# Patient Record
Sex: FEMALE | Race: BLACK OR AFRICAN AMERICAN | Employment: FULL TIME | URBAN - METROPOLITAN AREA
[De-identification: names, ages, dates, MRNs, and addresses within clinical notes are randomized per-mention and may not be internally consistent; named-entity substitution may affect disease eponyms.]

---

## 2018-08-24 ENCOUNTER — HOSPITAL ENCOUNTER (OUTPATIENT)
Dept: NUCLEAR MEDICINE | Age: 38
Discharge: HOME | End: 2018-08-24
Admitting: INTERNAL MEDICINE

## 2018-08-24 DIAGNOSIS — E83.52 HYPERCALCEMIA: ICD-10-CM

## 2018-08-24 RX ADMIN — Medication 20 MILLICURIE: at 10:07

## 2018-09-07 ENCOUNTER — OFFICE VISIT (OUTPATIENT)
Dept: SURGERY | Age: 38
End: 2018-09-07

## 2018-09-07 VITALS
OXYGEN SATURATION: 99 % | TEMPERATURE: 98.4 F | BODY MASS INDEX: 42.32 KG/M2 | HEART RATE: 84 BPM | WEIGHT: 254 LBS | HEIGHT: 65 IN | SYSTOLIC BLOOD PRESSURE: 146 MMHG | DIASTOLIC BLOOD PRESSURE: 101 MMHG

## 2018-09-07 DIAGNOSIS — D35.1 PARATHYROID ADENOMA: Primary | ICD-10-CM

## 2018-09-07 PROCEDURE — 99204 OFFICE O/P NEW MOD 45 MIN: CPT | Performed by: SURGERY

## 2018-10-22 ENCOUNTER — TELEPHONE (OUTPATIENT)
Dept: ENDOCRINOLOGY | Age: 38
End: 2018-10-22

## 2018-10-24 ENCOUNTER — OFFICE VISIT (OUTPATIENT)
Dept: ENDOCRINOLOGY | Age: 38
End: 2018-10-24
Payer: COMMERCIAL

## 2018-10-24 VITALS
DIASTOLIC BLOOD PRESSURE: 92 MMHG | WEIGHT: 254.8 LBS | HEART RATE: 89 BPM | SYSTOLIC BLOOD PRESSURE: 159 MMHG | HEIGHT: 65 IN | BODY MASS INDEX: 42.45 KG/M2

## 2018-10-24 DIAGNOSIS — E83.52 HYPERCALCEMIA: ICD-10-CM

## 2018-10-24 DIAGNOSIS — E21.3 HYPERPARATHYROIDISM (HCC): Primary | ICD-10-CM

## 2018-10-24 DIAGNOSIS — I10 ESSENTIAL HYPERTENSION: ICD-10-CM

## 2018-10-24 PROCEDURE — 99204 OFFICE O/P NEW MOD 45 MIN: CPT | Performed by: INTERNAL MEDICINE

## 2018-10-26 DIAGNOSIS — E21.3 HYPERPARATHYROIDISM (HCC): ICD-10-CM

## 2018-10-26 DIAGNOSIS — E83.52 HYPERCALCEMIA: ICD-10-CM

## 2018-10-26 LAB
24HR URINE VOLUME (ML): 2200 ML
A/G RATIO: 1.8 (ref 1.1–2.2)
ALBUMIN SERPL-MCNC: 4.6 G/DL (ref 3.4–5)
ALP BLD-CCNC: 146 U/L (ref 40–129)
ALT SERPL-CCNC: 20 U/L (ref 10–40)
ANION GAP SERPL CALCULATED.3IONS-SCNC: 9 MMOL/L (ref 3–16)
AST SERPL-CCNC: 17 U/L (ref 15–37)
BILIRUB SERPL-MCNC: 0.4 MG/DL (ref 0–1)
BUN BLDV-MCNC: 7 MG/DL (ref 7–20)
CALCIUM 24 HOUR URINE: 636 MG/24 HR (ref 42–353)
CALCIUM SERPL-MCNC: 13.5 MG/DL (ref 8.3–10.6)
CHLORIDE BLD-SCNC: 107 MMOL/L (ref 99–110)
CO2: 25 MMOL/L (ref 21–32)
CREAT SERPL-MCNC: <0.5 MG/DL (ref 0.6–1.1)
CREATININE 24 HOUR URINE: 2.4 G/24HR (ref 0.6–1.5)
GFR AFRICAN AMERICAN: >60
GFR NON-AFRICAN AMERICAN: >60
GLOBULIN: 2.6 G/DL
GLUCOSE BLD-MCNC: 86 MG/DL (ref 70–99)
PARATHYROID HORMONE INTACT: 257.8 PG/ML (ref 14–72)
POTASSIUM SERPL-SCNC: 4.5 MMOL/L (ref 3.5–5.1)
REASON FOR REJECTION: NORMAL
REJECTED TEST: NORMAL
SODIUM BLD-SCNC: 141 MMOL/L (ref 136–145)
TOTAL PROTEIN: 7.2 G/DL (ref 6.4–8.2)
VITAMIN D 25-HYDROXY: 10.4 NG/ML

## 2018-10-29 ENCOUNTER — TELEPHONE (OUTPATIENT)
Dept: ENDOCRINOLOGY | Age: 38
End: 2018-10-29

## 2018-10-29 LAB — VITAMIN D 1,25-DIHYDROXY: 101 PG/ML (ref 19.9–79.3)

## 2018-10-29 RX ORDER — CINACALCET 30 MG/1
TABLET, FILM COATED ORAL
Qty: 30 TABLET | Refills: 3 | Status: SHIPPED | OUTPATIENT
Start: 2018-10-29 | End: 2018-10-31 | Stop reason: SDUPTHER

## 2018-10-29 NOTE — PROGRESS NOTES
Calcium 13.5   Vit D 10 Vit D 1, 25 101 24 hour urine Ca 635  Discussed with patient.    PTH high, Vitamin D 25 is low but may not explain this degree of PTH elevation  With positive parathyroid scan, suspect hyperparathyroidism, she has appt with Dr. Devon Caban on 11/14  Will also start on sensipar  Get CXR to r/o any granulomatous lesion as Vit D 1,25 high  Repeat BMP in 4 week with f/u  Plan discussed with patient
D was also low at that time, Calcium normalized. May have 4 gland hyperplasia as last scan shows area suspicious for parathyroid hyperactivity. Will also rule out other causes, repeat PTH. Check 24 hour urine Ca. If PTH high, start on sensipar until surgery  Prolactin nl, Unlikely MEN  2. HTN : On Norvasc, mildly high. Plan: 1. Check PTH, Vitamin D 25 , 1/25, PTHrP  2.24 hour urine Calcium  3. Discuss with Dr. Sherrel Severin about repeat surgery  4. May start on Sensipar

## 2018-10-31 ENCOUNTER — TELEPHONE (OUTPATIENT)
Dept: ENDOCRINOLOGY | Age: 38
End: 2018-10-31

## 2018-10-31 RX ORDER — CINACALCET 30 MG/1
TABLET, FILM COATED ORAL
Qty: 60 TABLET | Refills: 3 | Status: SHIPPED | OUTPATIENT
Start: 2018-10-31 | End: 2019-01-14 | Stop reason: SDUPTHER

## 2018-11-01 ENCOUNTER — TELEPHONE (OUTPATIENT)
Dept: ENDOCRINOLOGY | Age: 38
End: 2018-11-01

## 2018-11-01 ENCOUNTER — HOSPITAL ENCOUNTER (OUTPATIENT)
Age: 38
Discharge: HOME | End: 2018-11-01
Payer: COMMERCIAL

## 2018-11-01 ENCOUNTER — HOSPITAL ENCOUNTER (OUTPATIENT)
Dept: GENERAL RADIOLOGY | Age: 38
Discharge: HOME | End: 2018-11-01
Payer: COMMERCIAL

## 2018-11-01 DIAGNOSIS — E83.52 HYPERCALCEMIA: ICD-10-CM

## 2018-11-01 PROCEDURE — 71046 X-RAY EXAM CHEST 2 VIEWS: CPT

## 2018-11-05 ENCOUNTER — TELEPHONE (OUTPATIENT)
Dept: ENDOCRINOLOGY | Age: 38
End: 2018-11-05

## 2018-11-08 ENCOUNTER — TELEPHONE (OUTPATIENT)
Dept: ENDOCRINOLOGY | Age: 38
End: 2018-11-08

## 2018-11-12 ENCOUNTER — TELEPHONE (OUTPATIENT)
Dept: ENDOCRINOLOGY | Age: 38
End: 2018-11-12

## 2018-11-14 ENCOUNTER — TELEPHONE (OUTPATIENT)
Dept: ENDOCRINOLOGY | Age: 38
End: 2018-11-14

## 2018-11-16 ENCOUNTER — TELEPHONE (OUTPATIENT)
Dept: ENDOCRINOLOGY | Age: 38
End: 2018-11-16

## 2018-11-16 NOTE — TELEPHONE ENCOUNTER
I have spoke to pharmacy several times about this and told them I will let them know when I get an answer, they call every day will not call them back until I hear from the insurance

## 2018-11-20 ENCOUNTER — TELEPHONE (OUTPATIENT)
Dept: ENDOCRINOLOGY | Age: 38
End: 2018-11-20

## 2018-12-06 ENCOUNTER — TELEPHONE (OUTPATIENT)
Dept: ENDOCRINOLOGY | Age: 38
End: 2018-12-06

## 2018-12-06 NOTE — TELEPHONE ENCOUNTER
Pharmacy was checking on Sensipar PA. This was faxed in November to insurance but the pharmacy has not heard anything back. They asked to talk to the nurse.

## 2019-01-14 ENCOUNTER — TELEPHONE (OUTPATIENT)
Dept: ENDOCRINOLOGY | Age: 39
End: 2019-01-14

## 2019-01-14 RX ORDER — CINACALCET 30 MG/1
TABLET, FILM COATED ORAL
Qty: 60 TABLET | Refills: 3 | Status: SHIPPED | OUTPATIENT
Start: 2019-01-14 | End: 2019-02-05 | Stop reason: SDUPTHER

## 2019-01-21 ENCOUNTER — TELEPHONE (OUTPATIENT)
Dept: ENDOCRINOLOGY | Age: 39
End: 2019-01-21

## 2019-02-05 ENCOUNTER — OFFICE VISIT (OUTPATIENT)
Dept: ENDOCRINOLOGY | Age: 39
End: 2019-02-05
Payer: COMMERCIAL

## 2019-02-05 VITALS
HEIGHT: 65 IN | SYSTOLIC BLOOD PRESSURE: 147 MMHG | RESPIRATION RATE: 16 BRPM | BODY MASS INDEX: 42.28 KG/M2 | DIASTOLIC BLOOD PRESSURE: 98 MMHG | HEART RATE: 67 BPM | OXYGEN SATURATION: 99 % | WEIGHT: 253.8 LBS

## 2019-02-05 DIAGNOSIS — E83.52 HYPERCALCEMIA: ICD-10-CM

## 2019-02-05 DIAGNOSIS — I10 ESSENTIAL HYPERTENSION: Primary | ICD-10-CM

## 2019-02-05 DIAGNOSIS — E21.3 HYPERPARATHYROIDISM (HCC): ICD-10-CM

## 2019-02-05 PROCEDURE — 99214 OFFICE O/P EST MOD 30 MIN: CPT | Performed by: INTERNAL MEDICINE

## 2019-02-05 RX ORDER — CINACALCET 30 MG/1
TABLET, FILM COATED ORAL
Qty: 60 TABLET | Refills: 3 | Status: SHIPPED | OUTPATIENT
Start: 2019-02-05

## 2019-02-05 RX ORDER — CALCITONIN SALMON 200 [IU]/.09ML
1 SPRAY, METERED NASAL DAILY
Qty: 1 BOTTLE | Refills: 3 | Status: SHIPPED | OUTPATIENT
Start: 2019-02-05

## 2019-02-26 DIAGNOSIS — E83.52 HYPERCALCEMIA: ICD-10-CM

## 2019-02-26 DIAGNOSIS — E21.3 HYPERPARATHYROIDISM (HCC): ICD-10-CM

## 2019-02-26 LAB
ANION GAP SERPL CALCULATED.3IONS-SCNC: 14 MMOL/L (ref 3–16)
BUN BLDV-MCNC: 7 MG/DL (ref 7–20)
CALCIUM SERPL-MCNC: 11.8 MG/DL (ref 8.3–10.6)
CHLORIDE BLD-SCNC: 106 MMOL/L (ref 99–110)
CO2: 22 MMOL/L (ref 21–32)
CREAT SERPL-MCNC: <0.5 MG/DL (ref 0.6–1.1)
GFR AFRICAN AMERICAN: >60
GFR NON-AFRICAN AMERICAN: >60
GLUCOSE BLD-MCNC: 85 MG/DL (ref 70–99)
POTASSIUM SERPL-SCNC: 4 MMOL/L (ref 3.5–5.1)
SODIUM BLD-SCNC: 142 MMOL/L (ref 136–145)

## 2022-05-02 ENCOUNTER — HOSPITAL ENCOUNTER (EMERGENCY)
Age: 42
Discharge: HOME | End: 2022-05-02
Attending: EMERGENCY MEDICINE
Payer: COMMERCIAL

## 2022-05-02 ENCOUNTER — APPOINTMENT (OUTPATIENT)
Dept: GENERAL RADIOLOGY | Age: 42
End: 2022-05-02
Payer: COMMERCIAL

## 2022-05-02 VITALS
OXYGEN SATURATION: 99 % | HEIGHT: 65 IN | RESPIRATION RATE: 20 BRPM | DIASTOLIC BLOOD PRESSURE: 103 MMHG | TEMPERATURE: 98.6 F | SYSTOLIC BLOOD PRESSURE: 161 MMHG | BODY MASS INDEX: 42.65 KG/M2 | WEIGHT: 256 LBS | HEART RATE: 83 BPM

## 2022-05-02 DIAGNOSIS — J45.901 MODERATE ASTHMA WITH EXACERBATION, UNSPECIFIED WHETHER PERSISTENT: Primary | ICD-10-CM

## 2022-05-02 DIAGNOSIS — R03.0 ELEVATED BLOOD PRESSURE READING: ICD-10-CM

## 2022-05-02 LAB
ANION GAP SERPL CALCULATED.3IONS-SCNC: 8 MMOL/L (ref 3–16)
BASOPHILS ABSOLUTE: 0 K/UL (ref 0–0.2)
BASOPHILS RELATIVE PERCENT: 0.9 %
BUN BLDV-MCNC: 6 MG/DL (ref 7–20)
CALCIUM SERPL-MCNC: 13.3 MG/DL (ref 8.3–10.6)
CHLORIDE BLD-SCNC: 105 MMOL/L (ref 99–110)
CO2: 25 MMOL/L (ref 21–32)
CREAT SERPL-MCNC: 0.6 MG/DL (ref 0.6–1.1)
EOSINOPHILS ABSOLUTE: 0.1 K/UL (ref 0–0.6)
EOSINOPHILS RELATIVE PERCENT: 2.1 %
GFR AFRICAN AMERICAN: >60
GFR NON-AFRICAN AMERICAN: >60
GLUCOSE BLD-MCNC: 102 MG/DL (ref 70–99)
HCT VFR BLD CALC: 36.5 % (ref 36–48)
HEMOGLOBIN: 11.9 G/DL (ref 12–16)
LYMPHOCYTES ABSOLUTE: 2.3 K/UL (ref 1–5.1)
LYMPHOCYTES RELATIVE PERCENT: 43 %
MCH RBC QN AUTO: 29.4 PG (ref 26–34)
MCHC RBC AUTO-ENTMCNC: 32.6 G/DL (ref 31–36)
MCV RBC AUTO: 90.1 FL (ref 80–100)
MONOCYTES ABSOLUTE: 0.5 K/UL (ref 0–1.3)
MONOCYTES RELATIVE PERCENT: 9.8 %
NEUTROPHILS ABSOLUTE: 2.3 K/UL (ref 1.7–7.7)
NEUTROPHILS RELATIVE PERCENT: 44.2 %
PDW BLD-RTO: 14.8 % (ref 12.4–15.4)
PLATELET # BLD: 236 K/UL (ref 135–450)
PMV BLD AUTO: 9.6 FL (ref 5–10.5)
POTASSIUM SERPL-SCNC: 3.7 MMOL/L (ref 3.5–5.1)
RBC # BLD: 4.05 M/UL (ref 4–5.2)
SODIUM BLD-SCNC: 138 MMOL/L (ref 136–145)
TROPONIN: <0.01 NG/ML
WBC # BLD: 5.3 K/UL (ref 4–11)

## 2022-05-02 PROCEDURE — 80048 BASIC METABOLIC PNL TOTAL CA: CPT

## 2022-05-02 PROCEDURE — 99285 EMERGENCY DEPT VISIT HI MDM: CPT

## 2022-05-02 PROCEDURE — 85025 COMPLETE CBC W/AUTO DIFF WBC: CPT

## 2022-05-02 PROCEDURE — 84484 ASSAY OF TROPONIN QUANT: CPT

## 2022-05-02 PROCEDURE — 94640 AIRWAY INHALATION TREATMENT: CPT

## 2022-05-02 PROCEDURE — 6370000000 HC RX 637 (ALT 250 FOR IP): Performed by: EMERGENCY MEDICINE

## 2022-05-02 PROCEDURE — 93005 ELECTROCARDIOGRAM TRACING: CPT | Performed by: EMERGENCY MEDICINE

## 2022-05-02 PROCEDURE — 71046 X-RAY EXAM CHEST 2 VIEWS: CPT

## 2022-05-02 RX ORDER — PREDNISONE 20 MG/1
60 TABLET ORAL ONCE
Status: COMPLETED | OUTPATIENT
Start: 2022-05-02 | End: 2022-05-02

## 2022-05-02 RX ORDER — PREDNISONE 20 MG/1
40 TABLET ORAL DAILY
Qty: 8 TABLET | Refills: 0 | Status: SHIPPED | OUTPATIENT
Start: 2022-05-02 | End: 2022-05-02 | Stop reason: SDUPTHER

## 2022-05-02 RX ORDER — ALBUTEROL SULFATE 90 UG/1
1 POWDER, METERED RESPIRATORY (INHALATION)
COMMUNITY
Start: 2021-10-21

## 2022-05-02 RX ORDER — PREDNISONE 20 MG/1
40 TABLET ORAL DAILY
Qty: 8 TABLET | Refills: 0 | Status: SHIPPED | OUTPATIENT
Start: 2022-05-02 | End: 2022-05-06

## 2022-05-02 RX ORDER — ALBUTEROL SULFATE 90 UG/1
2 AEROSOL, METERED RESPIRATORY (INHALATION) EVERY 6 HOURS PRN
Qty: 18 G | Refills: 1 | Status: SHIPPED | OUTPATIENT
Start: 2022-05-02

## 2022-05-02 RX ORDER — IPRATROPIUM BROMIDE AND ALBUTEROL SULFATE 2.5; .5 MG/3ML; MG/3ML
1 SOLUTION RESPIRATORY (INHALATION) ONCE
Status: COMPLETED | OUTPATIENT
Start: 2022-05-02 | End: 2022-05-02

## 2022-05-02 RX ORDER — LORATADINE AND PSEUDOEPHEDRINE SULFATE 5; 120 MG/1; MG/1
1 TABLET, EXTENDED RELEASE ORAL 2 TIMES DAILY PRN
Qty: 14 TABLET | Refills: 0 | Status: SHIPPED | OUTPATIENT
Start: 2022-05-02

## 2022-05-02 RX ORDER — ALBUTEROL SULFATE 90 UG/1
2 AEROSOL, METERED RESPIRATORY (INHALATION) EVERY 6 HOURS PRN
Qty: 18 G | Refills: 1 | Status: SHIPPED | OUTPATIENT
Start: 2022-05-02 | End: 2022-05-02 | Stop reason: SDUPTHER

## 2022-05-02 RX ORDER — LORATADINE AND PSEUDOEPHEDRINE SULFATE 5; 120 MG/1; MG/1
1 TABLET, EXTENDED RELEASE ORAL 2 TIMES DAILY PRN
Qty: 14 TABLET | Refills: 0 | Status: SHIPPED | OUTPATIENT
Start: 2022-05-02 | End: 2022-05-02 | Stop reason: SDUPTHER

## 2022-05-02 RX ADMIN — IPRATROPIUM BROMIDE AND ALBUTEROL SULFATE 1 AMPULE: .5; 3 SOLUTION RESPIRATORY (INHALATION) at 21:16

## 2022-05-02 RX ADMIN — PREDNISONE 60 MG: 20 TABLET ORAL at 21:06

## 2022-05-03 LAB
EKG ATRIAL RATE: 82 BPM
EKG DIAGNOSIS: NORMAL
EKG P AXIS: 48 DEGREES
EKG P-R INTERVAL: 220 MS
EKG Q-T INTERVAL: 372 MS
EKG QRS DURATION: 94 MS
EKG QTC CALCULATION (BAZETT): 434 MS
EKG R AXIS: 0 DEGREES
EKG T AXIS: 40 DEGREES
EKG VENTRICULAR RATE: 82 BPM

## 2022-05-03 PROCEDURE — 93010 ELECTROCARDIOGRAM REPORT: CPT | Performed by: INTERNAL MEDICINE

## 2022-05-03 NOTE — ED PROVIDER NOTES
[facility] Emergency Department      Pt Name: Byron Hudson  MRN: [id number]  [place] 1980  Date of evaluation: 5/2/2022  Provider: Marilyn Jones MD  CHIEF COMPLAINT  Chief Complaint   Patient presents with    Shortness of Breath     hx asthma - using inhalers multiple times today with no relief. HPI  Byron Hudson is a 42 y.o. female who presents because of difficulty breathing. She has increased breathing difficulty for about a week. She has been using her asthma inhaler and it has not helped. She has not had symptoms like this before. She used her inhaler 5 times so far today. She denies any chest pain. She has had a cough which is mostly nonproductive. She has noticed a postnasal drip. Her throat feels dry but there is no pain. Denies any leg swelling or leg pain. She is not on any supplemental estrogen. She denies any fever or chills. She has never required admission for asthma. REVIEW OF SYSTEMS:  No fever, no swelling, no abdominal pain Pertinent positives and negatives as per the HPI. All other review of systems reviewed and negative. Nursing notes reviewed. PAST MEDICAL HISTORY  Past Medical History:   Diagnosis Date    Asthma     Hypertension     PCOS (polycystic ovarian syndrome)      SURGICAL HISTORY  Past Surgical History:   Procedure Laterality Date    HYSTERECTOMY      PARATHYROIDECTOMY Right 05/11/2009    right inferior     MEDICATIONS:  No current facility-administered medications on file prior to encounter. Current Outpatient Medications on File Prior to Encounter   Medication Sig Dispense Refill    albuterol sulfate (PROAIR RESPICLICK) 101 (90 Base) MCG/ACT aerosol powder inhalation Inhale 1 puff into the lungs      Fluticasone-Salmeterol (ADVAIR DISKUS IN)       ibuprofen (ADVIL;MOTRIN) 800 MG tablet TAKE 1 TABLET BY MOUTH THREE TIMES DAILY( EVERY EIGHT HOURS) 90 tablet 0    ketoconazole (NIZORAL) 2 % cream Apply topically daily.  60 g 1  amLODIPine (NORVASC) 5 MG tablet Take 1.5 tablets by mouth daily 45 tablet 2    cinacalcet (SENSIPAR) 30 MG tablet One tab PO BID 60 tablet 3    calcitonin (MIACALCIN) 200 UNIT/ACT nasal spray 1 spray by Nasal route daily 1 Bottle 3    omeprazole (PRILOSEC) 40 MG delayed release capsule Take 1 capsule by mouth daily 30 capsule 3     ALLERGIES  Patient has no known allergies. FAMILY HISTORY:  History reviewed. No pertinent family history. SOCIAL HISTORY:  Social History     Tobacco Use    Smoking status: Never Smoker    Smokeless tobacco: Never Used   Vaping Use    Vaping Use: Never used   Substance Use Topics    Alcohol use: No    Drug use: No     IMMUNIZATIONS:    Immunization History   Administered Date(s) Administered    COVID-19, Moderna, Primary or Immunocompromised, PF, 100mcg/0.5mL 08/05/2021, 09/02/2021       PHYSICAL EXAM  VITAL SIGNS:  BP (!) 188/107   Pulse 92   Temp 98.6 °F (37 °C)   Resp 18   Ht 5' 5\" (1.651 m)   Wt 256 lb (116.1 kg)   LMP 04/09/2015 (Approximate) Comment: has periods ever 3 mos  SpO2 100%   BMI 42.60 kg/m²   Constitutional:  42 y.o. female alert, nontoxic  HENT:  Atraumatic, mucous membranes moist  Eyes:   Conjunctiva clear, no discharge, no icterus  Neck:  Supple, no adenopathy, no visible JVD, no stridor  Cardiovascular:  Regular, no rubs  Thorax & Lungs:  No accessory muscle usage, minimal wheeze  Abdomen:  Soft, non distended, bowel sounds present, nontender  Back:  No deformity  Genitalia:  Deferred  Rectal:  Deferred  Extremities:  No cyanosis, no tenderness, edema negative  Skin:  Warm, dry  Neurologic:  Alert, mentation normal  Psychiatric:  Affect appropriate    DIAGNOSTIC RESULTS:  Labs resulted at the time of this note reviewed.   Labs Reviewed   CBC WITH AUTO DIFFERENTIAL - Abnormal; Notable for the following components:       Result Value    Hemoglobin 11.9 (*)     All other components within normal limits   BASIC METABOLIC PANEL - Abnormal; Notable for the following components:    Glucose 102 (*)     BUN 6 (*)     Calcium 13.3 (*)     All other components within normal limits    Narrative:     Joycelyn Rooney  [facility] tel. [phone],  Chemistry results called to and read back by Amelia uBstillo, 05/02/2022 21:50,  by [address] Angélica Babin   TROPONIN    Narrative:     Joycelyn Rooney  [facility] tel. [phone],  Chemistry results called to and read back by Amelia Bustillo, 05/02/2022 21:50,  by 451 Angélica Babin     EKG:  Read by me in the absence of a cardiologist shows: Sinus rhythm, rate 82, first-degree AV block, nonspecific ST-T wave abnormality, normal axis, minimal change when compared to 9 December 2014    RADIOLOGY:    Plain x-rays were viewed by me:   XR CHEST (2 VW)   Final Result   No acute process with mild increased prominence of asymmetric elevation right   hemidiaphragm could represent technique or inspiratory effort versus   worsening asymmetry from prior 2018 exam           ED COURSE:  Medications administered:  Medications   ipratropium-albuterol (DUONEB) nebulizer solution 1 ampule (1 ampule Inhalation Given 5/2/22 2116)   predniSONE (DELTASONE) tablet 60 mg (60 mg Oral Given 5/2/22 2106)     PROCEDURES:  None    CRITICAL CARE:  None    CONSULTATIONS:  None    MEDICAL DECISION MAKING: Sydnee Shahid is a 42 y.o. female who presented because of breathing difficulty. After evaluation, the patient is comfortably breathing. Based on clinical presentation and diagnostics I do not believe she is experiencing symptoms from acute coronary syndrome, congestive heart failure, aortic dissection, pulmonary embolism, pneumothorax, myocarditis, Boerhaave syndrome, pericardial tamponade, acute abdomen, profound anemia or metabolic abnormality, etc. Sydnee Shahid was given appropriate discharge instructions. Referral to follow up provider.      FOLLOW UP:    Mychal Pfeiffer  [address]  [phone]    Schedule an appointment as soon as possible for a visit       [facility] Emergency Department  [address]  [place]  [phone]  Go to   If symptoms worsen    FINAL IMPRESSION:    1. Moderate asthma with exacerbation, unspecified whether persistent    2. Elevated blood pressure reading        (Please note that I used voice recognition software to generate this note.   Occasionally words are mistranscribed despite my efforts to edit errors.)        Jim Cordova MD  05/03/22 [id number]

## 2022-05-03 NOTE — ED NOTES
Discharge instructions given, patient acknowledged understanding, rx given x3, patient ambulated out of ed upon discharge      Geovanna Palacios RN  05/02/22 3342

## 2024-06-05 ENCOUNTER — HOSPITAL ENCOUNTER (OUTPATIENT)
Dept: NUCLEAR MEDICINE | Age: 44
Discharge: HOME | End: 2024-06-05
Attending: INTERNAL MEDICINE
Payer: COMMERCIAL

## 2024-06-05 DIAGNOSIS — E83.52 HYPERCALCEMIA: ICD-10-CM

## 2024-06-05 PROCEDURE — A9500 TC99M SESTAMIBI: HCPCS | Performed by: INTERNAL MEDICINE

## 2024-06-05 PROCEDURE — 78071 PARATHYRD PLANAR W/WO SUBTRJ: CPT

## 2024-06-05 PROCEDURE — 3430000000 HC RX DIAGNOSTIC RADIOPHARMACEUTICAL: Performed by: INTERNAL MEDICINE

## 2024-06-05 RX ORDER — TETRAKIS(2-METHOXYISOBUTYLISOCYANIDE)COPPER(I) TETRAFLUOROBORATE 1 MG/ML
29.1 INJECTION, POWDER, LYOPHILIZED, FOR SOLUTION INTRAVENOUS
Status: COMPLETED | OUTPATIENT
Start: 2024-06-05 | End: 2024-06-05

## 2024-06-05 RX ADMIN — TETRAKIS(2-METHOXYISOBUTYLISOCYANIDE)COPPER(I) TETRAFLUOROBORATE 29.1 MILLICURIE: 1 INJECTION, POWDER, LYOPHILIZED, FOR SOLUTION INTRAVENOUS at 10:26

## 2024-06-07 ENCOUNTER — HOSPITAL ENCOUNTER (OUTPATIENT)
Dept: ULTRASOUND IMAGING | Age: 44
Discharge: HOME | End: 2024-06-07
Attending: INTERNAL MEDICINE
Payer: COMMERCIAL

## 2024-06-07 DIAGNOSIS — E83.52 HYPERCALCEMIA: ICD-10-CM

## 2024-06-07 DIAGNOSIS — R94.6 ABNORMAL RADIONUCLIDE SCAN OF PARATHYROID GLAND: ICD-10-CM

## 2024-06-07 PROCEDURE — 76536 US EXAM OF HEAD AND NECK: CPT

## 2024-07-02 ENCOUNTER — OFFICE VISIT (OUTPATIENT)
Dept: SLEEP MEDICINE | Age: 44
End: 2024-07-02
Payer: COMMERCIAL

## 2024-07-02 VITALS
WEIGHT: 257 LBS | BODY MASS INDEX: 42.82 KG/M2 | HEIGHT: 65 IN | RESPIRATION RATE: 18 BRPM | HEART RATE: 80 BPM | TEMPERATURE: 97.9 F | DIASTOLIC BLOOD PRESSURE: 90 MMHG | OXYGEN SATURATION: 99 % | SYSTOLIC BLOOD PRESSURE: 130 MMHG

## 2024-07-02 DIAGNOSIS — R06.83 SNORING: ICD-10-CM

## 2024-07-02 DIAGNOSIS — R06.81 WITNESSED EPISODE OF APNEA: ICD-10-CM

## 2024-07-02 DIAGNOSIS — G47.33 OSA (OBSTRUCTIVE SLEEP APNEA): Primary | ICD-10-CM

## 2024-07-02 DIAGNOSIS — G47.19 EXCESSIVE DAYTIME SLEEPINESS: ICD-10-CM

## 2024-07-02 DIAGNOSIS — I10 PRIMARY HYPERTENSION: ICD-10-CM

## 2024-07-02 DIAGNOSIS — E66.01 CLASS 3 SEVERE OBESITY DUE TO EXCESS CALORIES WITH SERIOUS COMORBIDITY AND BODY MASS INDEX (BMI) OF 40.0 TO 44.9 IN ADULT (HCC): ICD-10-CM

## 2024-07-02 DIAGNOSIS — R06.89 GASPING FOR BREATH: ICD-10-CM

## 2024-07-02 PROCEDURE — 3080F DIAST BP >= 90 MM HG: CPT | Performed by: PSYCHIATRY & NEUROLOGY

## 2024-07-02 PROCEDURE — 99204 OFFICE O/P NEW MOD 45 MIN: CPT | Performed by: PSYCHIATRY & NEUROLOGY

## 2024-07-02 PROCEDURE — 3075F SYST BP GE 130 - 139MM HG: CPT | Performed by: PSYCHIATRY & NEUROLOGY

## 2024-07-02 PROCEDURE — 1036F TOBACCO NON-USER: CPT | Performed by: PSYCHIATRY & NEUROLOGY

## 2024-07-02 PROCEDURE — G8427 DOCREV CUR MEDS BY ELIG CLIN: HCPCS | Performed by: PSYCHIATRY & NEUROLOGY

## 2024-07-02 PROCEDURE — G8417 CALC BMI ABV UP PARAM F/U: HCPCS | Performed by: PSYCHIATRY & NEUROLOGY

## 2024-07-02 RX ORDER — ALBUTEROL SULFATE 90 UG/1
AEROSOL, METERED RESPIRATORY (INHALATION)
COMMUNITY
Start: 2024-06-24

## 2024-07-02 ASSESSMENT — SLEEP AND FATIGUE QUESTIONNAIRES
HOW LIKELY ARE YOU TO NOD OFF OR FALL ASLEEP WHILE SITTING INACTIVE IN A PUBLIC PLACE: HIGH CHANCE OF DOZING
HOW LIKELY ARE YOU TO NOD OFF OR FALL ASLEEP WHILE LYING DOWN TO REST IN THE AFTERNOON WHEN CIRCUMSTANCES PERMIT: HIGH CHANCE OF DOZING
NECK CIRCUMFERENCE (INCHES): 16
ESS TOTAL SCORE: 21
HOW LIKELY ARE YOU TO NOD OFF OR FALL ASLEEP WHILE WATCHING TV: HIGH CHANCE OF DOZING
HOW LIKELY ARE YOU TO NOD OFF OR FALL ASLEEP WHILE SITTING QUIETLY AFTER LUNCH WITHOUT ALCOHOL: HIGH CHANCE OF DOZING
HOW LIKELY ARE YOU TO NOD OFF OR FALL ASLEEP IN A CAR, WHILE STOPPED FOR A FEW MINUTES IN TRAFFIC: WOULD NEVER DOZE
HOW LIKELY ARE YOU TO NOD OFF OR FALL ASLEEP WHEN YOU ARE A PASSENGER IN A CAR FOR AN HOUR WITHOUT A BREAK: HIGH CHANCE OF DOZING
HOW LIKELY ARE YOU TO NOD OFF OR FALL ASLEEP WHILE SITTING AND READING: HIGH CHANCE OF DOZING
HOW LIKELY ARE YOU TO NOD OFF OR FALL ASLEEP WHILE SITTING AND TALKING TO SOMEONE: HIGH CHANCE OF DOZING

## 2024-07-02 ASSESSMENT — ENCOUNTER SYMPTOMS
EYES NEGATIVE: 1
CHOKING: 1
WHEEZING: 1
APNEA: 1
SORE THROAT: 1
ALLERGIC/IMMUNOLOGIC NEGATIVE: 1

## 2024-07-02 NOTE — PATIENT INSTRUCTIONS
Orders Placed This Encounter   Procedures    Home Sleep Study     Standing Status:   Future     Standing Expiration Date:   7/2/2025     Order Specific Question:   Location For Sleep Study     Answer:   [place]     Order Specific Question:   Select Sleep Lab Location     Answer:   [facility]    Sleep Study with PAP Titration     Standing Status:   Future     Standing Expiration Date:   7/2/2025     Order Specific Question:   Sleep Study Titration Type     Answer:   CPAP     Order Specific Question:   Location For Sleep Study     Answer:   [place]     Order Specific Question:   Select Sleep Lab Location     Answer:   [facility]

## 2024-07-02 NOTE — PROGRESS NOTES
MD MONI Shipman Board Certified in Sleep Medicine  Certified inBehavioral Sleep Medicine  Board Certified in Neurology [place] [facility]  [address]   [address]  [place]  [address]  P- [phone]   [facility]   [address]  [address]   [place], [place] [address]           [facility] PHYSICIANS [place] [facility] SLEEP MEDICINE WEST  [address] [address] [phone]    Subjective:     Patient ID: Eva Rowell is a 43 y.o. female.    Chief Complaint   Patient presents with    [facility]    Sleep Apnea    Hypertension       HPI:        Eva Rowell is a 43 y.o. female referred by Dr. Rooney for a sleep evaluation. She complains of snoring, snorting, choking, periods of not breathing, tossing and turning, kicking, excessive daytime sleepiness, feels sleepy during the day, take naps during the day but she denies knees buckling with laughing, completely or partially paralyzed while falling asleep or waking up.  Symptoms began several years ago, gradually worsening since that time.   The patient's bed-partner confirmed the snoring and stopped breathing at night  SLEEP SCHEDULE: Goes to bed around 10 PM in the weekdays and 10 PM in the weekends. It usually takes the patient 120-180 minutes to fall asleep. The patient gets up 3-5 per night to go to the bathroom. The Patient finally gets up at 7 AM during the weekdays and 7 AM in the weekends. patient wakes up with dry mouth and sometimes morning headache.. the headache usually dull headache.  The patient has restless sleep with frequent arousals in addition to the Patient has significant daytime sleepiness. The Patient scored Humbird Sleepiness Score: 21 on Humbird Sleepiness Scale ( more than 10 is indicative of daytime sleepiness)and 30 in fatigue scale ( more than 36 is indicative of daytime fatigue). The patient takes daily nap for 1-4 hours

## 2024-07-03 ENCOUNTER — OFFICE VISIT (OUTPATIENT)
Dept: ENT CLINIC | Age: 44
End: 2024-07-03
Payer: COMMERCIAL

## 2024-07-03 VITALS
HEART RATE: 72 BPM | WEIGHT: 256 LBS | SYSTOLIC BLOOD PRESSURE: 136 MMHG | BODY MASS INDEX: 42.65 KG/M2 | HEIGHT: 65 IN | OXYGEN SATURATION: 96 % | DIASTOLIC BLOOD PRESSURE: 84 MMHG

## 2024-07-03 DIAGNOSIS — E21.0 PRIMARY HYPERPARATHYROIDISM (HCC): Primary | ICD-10-CM

## 2024-07-03 DIAGNOSIS — E83.52 HYPERCALCEMIA: ICD-10-CM

## 2024-07-03 PROCEDURE — 99204 OFFICE O/P NEW MOD 45 MIN: CPT | Performed by: STUDENT IN AN ORGANIZED HEALTH CARE EDUCATION/TRAINING PROGRAM

## 2024-07-03 PROCEDURE — 1036F TOBACCO NON-USER: CPT | Performed by: STUDENT IN AN ORGANIZED HEALTH CARE EDUCATION/TRAINING PROGRAM

## 2024-07-03 PROCEDURE — G8417 CALC BMI ABV UP PARAM F/U: HCPCS | Performed by: STUDENT IN AN ORGANIZED HEALTH CARE EDUCATION/TRAINING PROGRAM

## 2024-07-03 PROCEDURE — G8427 DOCREV CUR MEDS BY ELIG CLIN: HCPCS | Performed by: STUDENT IN AN ORGANIZED HEALTH CARE EDUCATION/TRAINING PROGRAM

## 2024-07-03 NOTE — PROGRESS NOTES
[facility]  DIVISION OF OTOLARYNGOLOGY- HEAD & NECK SURGERY  CONSULT      Eva Rowell (:  1980) is a 43 y.o. female, here for evaluation of the following chief complaint(s):  Thyroid Problem      ASSESSMENT/PLAN:  1. Primary hyperparathyroidism (HCC)  -     Amb External Referral To ENT  2. Hypercalcemia      This is a very pleasant 43 y.o. female here today for evaluation of the the above-noted complaints.      The patient is status post surgery in  and 2019 for primary hyperparathyroidism.    Continues to be hypercalcemic and symptomatic.    I discussed this patient with one of my colleagues at the [facility].  I feel that she would benefit from reevaluation by her surgeon, Dr. Quezada, to see if she would be a candidate for revision surgery.  They could consider a 40 scan to see if there is any ectopic parathyroid tissue.      Medical Decision Making:  The following items were considered in medical decision making:  Independent review of images  Review / order clinical lab tests  Review / order radiology tests  Decision to obtain old records  Review and summation of old [facility] as [facility] if [facility]    SUBJECTIVE/OBJECTIVE:  HPI    Eva Rowell is here today for evaluation of primary hyperparathyroidism.  The patient has a history of dating back to the early .  She initially underwent surgery  at [facility] [place] where they removed a 2 g parathyroid adenoma.  She had persistent symptoms and was taken to surgery in 2019 by Dr. Mendoza at [facility].  I reviewed office notes and surgical notes as well as pathology reports.    -TSH from 2024-1.49  -CMP from 2024-calcium 13.7  -Vitamin D from 2024-9.4  -PTH from 2024-360.6, previously 257 on 10/26/2028    I reviewed the patient's most recent lab results with her as well as discussed her prior surgeries and plans.  It sounds like there is a concern for

## 2024-07-10 ENCOUNTER — HOSPITAL ENCOUNTER (OUTPATIENT)
Dept: SLEEP CENTER | Age: 44
Discharge: HOME | End: 2024-07-10
Attending: PSYCHIATRY & NEUROLOGY
Payer: COMMERCIAL

## 2024-07-10 DIAGNOSIS — G47.33 OSA (OBSTRUCTIVE SLEEP APNEA): ICD-10-CM

## 2024-07-10 PROCEDURE — 95806 SLEEP STUDY UNATT&RESP EFFT: CPT

## 2024-07-15 PROBLEM — G47.33 OSA (OBSTRUCTIVE SLEEP APNEA): Status: ACTIVE | Noted: 2024-07-15

## 2024-07-16 ENCOUNTER — TELEPHONE (OUTPATIENT)
Dept: PULMONOLOGY | Age: 44
End: 2024-07-16

## 2024-07-16 NOTE — TELEPHONE ENCOUNTER
Sleep study showed moderate KIMO.  AHI was 29.9  per hr. And O2 Desaturations to 81%.  Dr Recommends titration.      Pt notified of results  Transferred to the sleep lab

## 2024-07-18 ENCOUNTER — HOSPITAL ENCOUNTER (OUTPATIENT)
Dept: SLEEP CENTER | Age: 44
Discharge: HOME | End: 2024-07-18
Payer: COMMERCIAL

## 2024-07-18 DIAGNOSIS — G47.33 OSA (OBSTRUCTIVE SLEEP APNEA): ICD-10-CM

## 2024-07-18 PROCEDURE — 95811 POLYSOM 6/>YRS CPAP 4/> PARM: CPT

## 2024-07-22 NOTE — PROGRESS NOTES
Eva Rowell         : 1980  [] [facility]     [] [facility]      [] [facility]     [][facility]    [] [facility]  [] [facility]  [] [facility]   [] [facility] [] Other:  Diagnosis: [x] KIMO (G47.33) [] CSA (G47.31) [] Apnea (G47.30)   Length of Need: [] 12 Months [x] 99 Months [] Other:    Machine (MARICRUZ!):  [x] ResMed AirSense     Auto [] Other:     [x]  CPAP () [] Bilevel ()   Mode: [x] Auto [] Spontaneous    Mode: [] Auto [] Spontaneous                       11 cm                 Comfort Settings:     If the order for CPAP  - Ramp Pressure: 5 cmH2O                                        - Ramp time: 15 min                                     -  Flex/EPR - 3 full time       Humidifier: [x] Heated ()        [x] Water chamber replacement ()/ 1 per 6 months        Mask:  Please always start with the mask the patient used during the titraion    [x] Full Face () /1 per 3 months    [x] Patient Choice - Size and fit mask    [x] Dispense: medium Airfit F20     [x] Headgear () / 1 per 3 months    [x] Interface Replacement ()/1 per month            Tubing: [x] Heated ()/1 per 3 months    [] Standard ()/1 per 3 months [] Other:           Filters: [x] Non-disposable ()/1 per 6 months     [x] Ultra-Fine, Disposable ()/2 per month        Miscellaneous: [x] Chin Strap ()/ 1 per 6 months [] O2 bleed-in:       LPM   [] Oximetry on CPAP/Bilevel []  Other:          Start Order Date: 24    MEDICAL JUSTIFICATION:  I, the undersigned, certify that the above prescribed supplies are medically necessary for this patient’s wellbeing.  In my opinion, the supplies are both reasonable and necessary in reference to accepted standards of medicalpractice in treatment of this patient’s condition.    Mike Muñoz MD      NPI: [id number]       Order Signed Date: 24    Electronically signed by Mike Muñoz MD on 2024 at 10:40 AM

## 2024-07-24 ENCOUNTER — TELEPHONE (OUTPATIENT)
Dept: PULMONOLOGY | Age: 44
End: 2024-07-24

## 2024-07-24 NOTE — TELEPHONE ENCOUNTER
Titration study shows adequate control of the events at a CPAP pressure of 11 cm.      DME: will call back ([facility] does not take her insurance)     Patient will need appointment 31-90 days after starting new machine    The patient has been notified of this information and all questions answered.

## 2024-10-10 ENCOUNTER — TELEPHONE (OUTPATIENT)
Dept: PULMONOLOGY | Age: 44
End: 2024-10-10

## 2024-10-22 ENCOUNTER — TELEPHONE (OUTPATIENT)
Dept: PULMONOLOGY | Age: 44
End: 2024-10-22

## 2024-10-22 NOTE — TELEPHONE ENCOUNTER
Lmom for patient to call and schedule follow up appt. [facility] needs medical necessity form returned to them after patient is seen. Scanned into [employer]

## 2024-12-30 ASSESSMENT — SLEEP AND FATIGUE QUESTIONNAIRES
HOW LIKELY ARE YOU TO NOD OFF OR FALL ASLEEP WHILE LYING DOWN TO REST IN THE AFTERNOON WHEN CIRCUMSTANCES PERMIT: HIGH CHANCE OF DOZING
ESS TOTAL SCORE: 18
HOW LIKELY ARE YOU TO NOD OFF OR FALL ASLEEP WHILE SITTING INACTIVE IN A PUBLIC PLACE: HIGH CHANCE OF DOZING
HOW LIKELY ARE YOU TO NOD OFF OR FALL ASLEEP WHILE WATCHING TV: HIGH CHANCE OF DOZING
HOW LIKELY ARE YOU TO NOD OFF OR FALL ASLEEP WHILE SITTING AND TALKING TO SOMEONE: MODERATE CHANCE OF DOZING
HOW LIKELY ARE YOU TO NOD OFF OR FALL ASLEEP WHILE SITTING AND READING: MODERATE CHANCE OF DOZING
HOW LIKELY ARE YOU TO NOD OFF OR FALL ASLEEP WHEN YOU ARE A PASSENGER IN A CAR FOR AN HOUR WITHOUT A BREAK: MODERATE CHANCE OF DOZING
HOW LIKELY ARE YOU TO NOD OFF OR FALL ASLEEP WHILE SITTING QUIETLY AFTER LUNCH WITHOUT ALCOHOL: HIGH CHANCE OF DOZING
HOW LIKELY ARE YOU TO NOD OFF OR FALL ASLEEP IN A CAR, WHILE STOPPED FOR A FEW MINUTES IN TRAFFIC: WOULD NEVER DOZE
HOW LIKELY ARE YOU TO NOD OFF OR FALL ASLEEP IN A CAR, WHILE STOPPED FOR A FEW MINUTES IN TRAFFIC: WOULD NEVER DOZE
HOW LIKELY ARE YOU TO NOD OFF OR FALL ASLEEP WHEN YOU ARE A PASSENGER IN A CAR FOR AN HOUR WITHOUT A BREAK: MODERATE CHANCE OF DOZING
HOW LIKELY ARE YOU TO NOD OFF OR FALL ASLEEP WHILE SITTING AND TALKING TO SOMEONE: MODERATE CHANCE OF DOZING
HOW LIKELY ARE YOU TO NOD OFF OR FALL ASLEEP WHILE SITTING AND READING: MODERATE CHANCE OF DOZING
HOW LIKELY ARE YOU TO NOD OFF OR FALL ASLEEP WHILE LYING DOWN TO REST IN THE AFTERNOON WHEN CIRCUMSTANCES PERMIT: HIGH CHANCE OF DOZING
HOW LIKELY ARE YOU TO NOD OFF OR FALL ASLEEP WHILE SITTING QUIETLY AFTER LUNCH WITHOUT ALCOHOL: HIGH CHANCE OF DOZING
HOW LIKELY ARE YOU TO NOD OFF OR FALL ASLEEP WHILE WATCHING TV: HIGH CHANCE OF DOZING
HOW LIKELY ARE YOU TO NOD OFF OR FALL ASLEEP WHILE SITTING INACTIVE IN A PUBLIC PLACE: HIGH CHANCE OF DOZING

## 2025-01-02 ENCOUNTER — OFFICE VISIT (OUTPATIENT)
Dept: SLEEP MEDICINE | Age: 45
End: 2025-01-02

## 2025-01-02 VITALS
TEMPERATURE: 98.6 F | HEART RATE: 87 BPM | RESPIRATION RATE: 18 BRPM | SYSTOLIC BLOOD PRESSURE: 162 MMHG | HEIGHT: 65 IN | WEIGHT: 262 LBS | OXYGEN SATURATION: 94 % | DIASTOLIC BLOOD PRESSURE: 99 MMHG | BODY MASS INDEX: 43.65 KG/M2

## 2025-01-02 DIAGNOSIS — G47.33 OSA ON CPAP: Primary | ICD-10-CM

## 2025-01-02 DIAGNOSIS — E66.01 CLASS 3 SEVERE OBESITY DUE TO EXCESS CALORIES WITH SERIOUS COMORBIDITY AND BODY MASS INDEX (BMI) OF 40.0 TO 44.9 IN ADULT: ICD-10-CM

## 2025-01-02 DIAGNOSIS — Z99.89 DEPENDENCE ON OTHER ENABLING MACHINES AND DEVICES: ICD-10-CM

## 2025-01-02 DIAGNOSIS — E66.813 CLASS 3 SEVERE OBESITY DUE TO EXCESS CALORIES WITH SERIOUS COMORBIDITY AND BODY MASS INDEX (BMI) OF 40.0 TO 44.9 IN ADULT: ICD-10-CM

## 2025-01-02 ASSESSMENT — SLEEP AND FATIGUE QUESTIONNAIRES
HOW LIKELY ARE YOU TO NOD OFF OR FALL ASLEEP WHILE SITTING QUIETLY AFTER LUNCH WITHOUT ALCOHOL: HIGH CHANCE OF DOZING
HOW LIKELY ARE YOU TO NOD OFF OR FALL ASLEEP WHILE LYING DOWN TO REST IN THE AFTERNOON WHEN CIRCUMSTANCES PERMIT: HIGH CHANCE OF DOZING
HOW LIKELY ARE YOU TO NOD OFF OR FALL ASLEEP WHILE SITTING AND READING: MODERATE CHANCE OF DOZING
HOW LIKELY ARE YOU TO NOD OFF OR FALL ASLEEP IN A CAR, WHILE STOPPED FOR A FEW MINUTES IN TRAFFIC: WOULD NEVER DOZE
ESS TOTAL SCORE: 18
HOW LIKELY ARE YOU TO NOD OFF OR FALL ASLEEP WHEN YOU ARE A PASSENGER IN A CAR FOR AN HOUR WITHOUT A BREAK: MODERATE CHANCE OF DOZING
HOW LIKELY ARE YOU TO NOD OFF OR FALL ASLEEP WHILE SITTING INACTIVE IN A PUBLIC PLACE: HIGH CHANCE OF DOZING
HOW LIKELY ARE YOU TO NOD OFF OR FALL ASLEEP WHILE SITTING AND TALKING TO SOMEONE: MODERATE CHANCE OF DOZING
HOW LIKELY ARE YOU TO NOD OFF OR FALL ASLEEP WHILE WATCHING TV: HIGH CHANCE OF DOZING

## 2025-01-02 NOTE — PROGRESS NOTES
MD MONI Muñoz Board Certified in Sleep Medicine  Certified in Behavioral Sleep Medicine  Board Certified in Neurology [place] [facility]  [address]   [address]  [place]  [address]  [phone]   [facility]  [address]  [address]  [place], [place] [address]                      [facility] PHYSICIANS [place] SPECIALTY CARE [facility] SLEEP MEDICINE WEST  [address] [address] [phone]    Subjective:     Patient ID: Eva Rowell is a 44 y.o. female.    Chief Complaint   Patient presents with    Follow-up    Sleep Apnea       HPI:        Eva Rowell is a 44 y.o. female was seen today as a follow for obstructive sleep apnea. The patient underwent home sleep testing on 07/10/2024, the overnight registration revealed moderate obstructive sleep apnea with apnea hypopnea index of 29.9/hr with lowest O2 saturation of 81%, patient spent about 294.8 minutes below 90% (weight was 257 pounds). Subsequently, the patient underwent successful PAP titration on 08/18/2024, the lowest O2 saturation while on PAP was 93%.  Patient is using the PAP machine about 23% of the time, more than 4 hours a night about  14 %, in total average of 4:43 hours a night in last 90 days.  Currently on PAP at 11 cm (), the AHI is only 1.8 events per hour at this pressure.  Did help her when she used it.   The Patient scored Dovray Sleepiness Score: 18 on Dovray Sleepiness Scale ( more than 10 is indicative of daytime sleepiness)   Patient has no problem with PAP pressure or mask. Uses F20.  With nasal mask, (N30i) she felt too much air.   Wakes up in the morning with dry mouth, the setting of the heated humidifier.  BP is stable. Has gained 5 pounds since last visit.   DOT/CDL - N/A      Previous Report(s)Reviewed: historical medical records         Social History     Socioeconomic History    Marital status:      Spouse name:

## 2025-02-04 ENCOUNTER — TELEPHONE (OUTPATIENT)
Dept: BARIATRICS/WEIGHT MGMT | Age: 45
End: 2025-02-04

## 2025-02-04 NOTE — TELEPHONE ENCOUNTER
Called as a new pt courtesy call - spoke w patient.  Did receive paperwork - told patient to have new pt paperwork completely filled out, insurance card, and id and arrival time.  Confirmed [place] location. If they didn't have the paperwork filled out and arrive on time may be rescheduled

## 2025-02-06 ENCOUNTER — OFFICE VISIT (OUTPATIENT)
Dept: BARIATRICS/WEIGHT MGMT | Age: 45
End: 2025-02-06
Payer: COMMERCIAL

## 2025-02-06 VITALS
DIASTOLIC BLOOD PRESSURE: 112 MMHG | WEIGHT: 267 LBS | HEART RATE: 75 BPM | SYSTOLIC BLOOD PRESSURE: 173 MMHG | HEIGHT: 65 IN | BODY MASS INDEX: 44.48 KG/M2

## 2025-02-06 DIAGNOSIS — E88.810 METABOLIC SYNDROME: Primary | ICD-10-CM

## 2025-02-06 DIAGNOSIS — G47.33 OSA ON CPAP: ICD-10-CM

## 2025-02-06 DIAGNOSIS — K21.9 CHRONIC GASTROESOPHAGEAL REFLUX DISEASE WITHOUT ESOPHAGITIS: ICD-10-CM

## 2025-02-06 DIAGNOSIS — E66.01 MORBID OBESITY WITH BMI OF 40.0-44.9, ADULT: ICD-10-CM

## 2025-02-06 DIAGNOSIS — I10 HYPERTENSION, ESSENTIAL: ICD-10-CM

## 2025-02-06 DIAGNOSIS — E05.90 HYPERTHYROIDISM: ICD-10-CM

## 2025-02-06 PROCEDURE — 99205 OFFICE O/P NEW HI 60 MIN: CPT | Performed by: SURGERY

## 2025-02-06 PROCEDURE — 3080F DIAST BP >= 90 MM HG: CPT | Performed by: SURGERY

## 2025-02-06 PROCEDURE — 3077F SYST BP >= 140 MM HG: CPT | Performed by: SURGERY

## 2025-02-06 NOTE — PROGRESS NOTES
[facility]   General & Laparoscopic Surgery  [employer] [facility] [employer]      HPI:    Eva Rowell is a very pleasant 44 y.o. obese female ,   Body mass index is 44.43 kg/m².  And multiple medical problems who is presenting for weight loss surgery evaluation and consultation by Mike Zapata MD.     Patient has been struggling for several years now with obesity. Patient feels the weight is an obstacle to achieve and perform things in daily living as well risk on health.     Tries to diet, and exercise but can't keep the weight off.  Patient tried other regimens, but with no sustainable weight loss.     Patient  is very determined to lose weight and be healthy, and is interested in surgical weight loss to achieve this goal.    Otherwise patient denies any nausea, vomiting, fevers, chills, shortness of breath, chest pain, constipation or urinary symptoms.      Morbid obesity and co-morbidities related to it are a threat to bodily function.    Hx of sleep apnea, currently using c-pap.   Non smoker, non drinker, not currently on blood thinners, or chronic steroids.     Past Medical History:   Diagnosis Date    Asthma     Chronic gastroesophageal reflux disease without esophagitis     Hypertension, essential     Hyperthyroidism     Metabolic syndrome     Migraines     Morbid obesity with BMI of 40.0-44.9, adult     PCOS (polycystic ovarian syndrome)     Sleep apnea, obstructive      Past Surgical History:   Procedure Laterality Date    HYSTERECTOMY (CERVIX STATUS UNKNOWN)      PARATHYROIDECTOMY Right 05/11/2009    right inferior     Family History   Problem Relation Age of Onset    Hypertension Mother     Coronary Art Dis Mother     Coronary Art Dis Father     Hypertension Father     Clotting Disorder Father     COPD Maternal Grandmother     Cancer Maternal Grandmother     Stroke Maternal Grandmother     Hypertension Maternal Grandmother     Glaucoma Maternal Grandmother     Asthma Brother

## 2025-02-06 NOTE — PROGRESS NOTES
Eva Rowell is a 44 y.o. female with a date of birth of 1980.    Vitals:    02/06/25 0925   BP: (!) 173/112   Pulse: 75    BMI: Body mass index is 44.43 kg/m². Obesity Classification: Class III    Weight History:   Wt Readings from Last 3 Encounters:   02/06/25 121.1 kg (267 lb)   01/02/25 118.8 kg (262 lb)   11/11/24 117 kg (258 lb)     Patient's lowest adult weight was 250 lbs at age 44.     Patient's highest adult weight was 270-300 lbs at age 37.     Patient has participated in the following weight loss programs: cabbage soup and fasting.   Patient has participated in meal replacement/liquid diets.  Patient has participated in weight loss medications.    Patient is lactose intolerant.  Patient does not have Uatsdin/cultural food concerns. Patient does have food allergies- eggs (diarrhea). Patient does tolerate artificial sweeteners.     24 hour recall/food frequency chart:  Breakfast: yes. raisin bran OR skips  Snack: yes. fruit  Lunch: no.   Snack: yes. half bag of chips (family size)  Dinner: yes. 3 pc chicken w/ 2 pc fish, fries, mashed potatoes & green beans  Snack: yes. donuts  Drinks throughout the day: water, soda ~2-3 cans, sports drinks, sweet tea, coffee w/ sf flavor, fruit juice, red bull  Do you drink alcohol? No.     Patient does not meet the criteria for binge eating disorder. Patient does have grazing. Patient does have night eating. Patient does not have a history of emotional eating or eating out of boredom.    Surgery  Patient does feel confident in her ability to make these changes.  The patient's expectations of post-surgical eating habits - voices understanding.    Patient states she does understand the consequences of not complying with post-op food guidelines.  Patient states she does understands the long term changes in food intake that will be necessary for all occasions after surgery for the rest of her life.      Patient is deemed nutritionally appropriate to

## 2025-03-06 ENCOUNTER — OFFICE VISIT (OUTPATIENT)
Dept: SLEEP MEDICINE | Age: 45
End: 2025-03-06
Payer: COMMERCIAL

## 2025-03-06 ENCOUNTER — OFFICE VISIT (OUTPATIENT)
Dept: BARIATRICS/WEIGHT MGMT | Age: 45
End: 2025-03-06
Payer: COMMERCIAL

## 2025-03-06 VITALS
DIASTOLIC BLOOD PRESSURE: 95 MMHG | WEIGHT: 263.6 LBS | BODY MASS INDEX: 43.92 KG/M2 | OXYGEN SATURATION: 98 % | HEIGHT: 65 IN | SYSTOLIC BLOOD PRESSURE: 170 MMHG | RESPIRATION RATE: 18 BRPM | HEART RATE: 83 BPM | TEMPERATURE: 97.9 F

## 2025-03-06 VITALS
HEIGHT: 65 IN | WEIGHT: 263 LBS | DIASTOLIC BLOOD PRESSURE: 113 MMHG | HEART RATE: 113 BPM | BODY MASS INDEX: 43.82 KG/M2 | SYSTOLIC BLOOD PRESSURE: 188 MMHG

## 2025-03-06 DIAGNOSIS — E66.01 MORBID OBESITY WITH BMI OF 40.0-44.9, ADULT: ICD-10-CM

## 2025-03-06 DIAGNOSIS — G47.33 OSA (OBSTRUCTIVE SLEEP APNEA): ICD-10-CM

## 2025-03-06 DIAGNOSIS — G47.33 OSA ON CPAP: ICD-10-CM

## 2025-03-06 DIAGNOSIS — I10 HYPERTENSION, ESSENTIAL: ICD-10-CM

## 2025-03-06 DIAGNOSIS — K21.9 CHRONIC GASTROESOPHAGEAL REFLUX DISEASE WITHOUT ESOPHAGITIS: ICD-10-CM

## 2025-03-06 DIAGNOSIS — Z99.89 DEPENDENCE ON OTHER ENABLING MACHINES AND DEVICES: ICD-10-CM

## 2025-03-06 DIAGNOSIS — E88.810 METABOLIC SYNDROME: Primary | ICD-10-CM

## 2025-03-06 DIAGNOSIS — G47.33 OSA ON CPAP: Primary | ICD-10-CM

## 2025-03-06 PROCEDURE — 99214 OFFICE O/P EST MOD 30 MIN: CPT | Performed by: PSYCHIATRY & NEUROLOGY

## 2025-03-06 PROCEDURE — 3077F SYST BP >= 140 MM HG: CPT | Performed by: PSYCHIATRY & NEUROLOGY

## 2025-03-06 PROCEDURE — G2211 COMPLEX E/M VISIT ADD ON: HCPCS | Performed by: PSYCHIATRY & NEUROLOGY

## 2025-03-06 PROCEDURE — 99214 OFFICE O/P EST MOD 30 MIN: CPT | Performed by: SURGERY

## 2025-03-06 PROCEDURE — 3080F DIAST BP >= 90 MM HG: CPT | Performed by: PSYCHIATRY & NEUROLOGY

## 2025-03-06 PROCEDURE — G2211 COMPLEX E/M VISIT ADD ON: HCPCS | Performed by: SURGERY

## 2025-03-06 PROCEDURE — 3080F DIAST BP >= 90 MM HG: CPT | Performed by: SURGERY

## 2025-03-06 PROCEDURE — 3077F SYST BP >= 140 MM HG: CPT | Performed by: SURGERY

## 2025-03-06 ASSESSMENT — SLEEP AND FATIGUE QUESTIONNAIRES
HOW LIKELY ARE YOU TO NOD OFF OR FALL ASLEEP WHILE SITTING AND TALKING TO SOMEONE: WOULD NEVER DOZE
ESS TOTAL SCORE: 13
HOW LIKELY ARE YOU TO NOD OFF OR FALL ASLEEP WHEN YOU ARE A PASSENGER IN A CAR FOR AN HOUR WITHOUT A BREAK: HIGH CHANCE OF DOZING
HOW LIKELY ARE YOU TO NOD OFF OR FALL ASLEEP WHILE SITTING QUIETLY AFTER LUNCH WITHOUT ALCOHOL: HIGH CHANCE OF DOZING
HOW LIKELY ARE YOU TO NOD OFF OR FALL ASLEEP WHILE LYING DOWN TO REST IN THE AFTERNOON WHEN CIRCUMSTANCES PERMIT: HIGH CHANCE OF DOZING
HOW LIKELY ARE YOU TO NOD OFF OR FALL ASLEEP IN A CAR, WHILE STOPPED FOR A FEW MINUTES IN TRAFFIC: WOULD NEVER DOZE
HOW LIKELY ARE YOU TO NOD OFF OR FALL ASLEEP WHILE SITTING AND READING: MODERATE CHANCE OF DOZING
HOW LIKELY ARE YOU TO NOD OFF OR FALL ASLEEP WHILE SITTING INACTIVE IN A PUBLIC PLACE: MODERATE CHANCE OF DOZING
HOW LIKELY ARE YOU TO NOD OFF OR FALL ASLEEP WHILE WATCHING TV: WOULD NEVER DOZE

## 2025-03-06 NOTE — PATIENT INSTRUCTIONS
Patient received dietary handouts and education.    Plan/Recommendations:   - Focus on eating 4x/day   - Eliminating carbonation   - Try protein powder   - Attend SG  - Add in exercise as able

## 2025-03-06 NOTE — PROGRESS NOTES
Eva LEMUS Sorin lost 4 lbs over 1 mo.  Pt lactose intolerant. Drinks lactaid milk or almond milk (discussed).     Breakfast: skips or Premier Protein Shakes w/ bagel w/ CC   Snack: none  Lunch 12-1 pm: baked chix + grover + mashed potatoes OR brussels sprouts + mac n cheese   Snack: rice cakes   Dinner: salmon + rice + veggies   Snack: Premier Protein Shake    Is pt consuming smaller portions? yes    Tracking with notebook.   Pt working on eliminating fried foods.     Is pt consuming at least 64 oz of fluids per day? yes 5 bottles of water      Is pt consuming carbonated, caffeinated, or sugary beverages? yes mini cans of soda. Pt states working on cutting out.    Has pt sampled Unjury and/or Nectar protein? Encouraged pt to try grab'n'go's before next appointment.    Has patient attended a support group? Scheduled for 3/19    Exercise: not currently     Plan/Recommendations:   - Focus on eating 4x/day   - Eliminating carbonation   - Try protein powder   - Attend SG  - Add in exercise as able     Handouts: none    Lea Gill RD  
100 MG extended release tablet, Take 1 tablet by mouth daily, Disp: 30 tablet, Rfl: 3    NIFEdipine (NIFEDICAL XL) 60 MG extended release tablet, Take 1 tablet by mouth in the morning and at bedtime, Disp: 60 tablet, Rfl: 2    furosemide (LASIX) 20 MG tablet, Take 1 tablet by mouth 2 times daily, Disp: 60 tablet, Rfl: 3    metoprolol succinate (TOPROL XL) 50 MG extended release tablet, Take 1 tablet by mouth daily In the evening, Disp: 90 tablet, Rfl: 0      Review of Systems - History obtained from the patient  General ROS: negative  Psychological ROS: negative  Endocrine ROS: negative  Respiratory ROS: negative  Cardiovascular ROS: negative  Gastrointestinal ROS:negative  Genito-Urinary ROS: negative  Musculoskeletal ROS: negative   Skin ROS: negative    Physical Exam   Vitals Reviewed   Constitutional: Patient is oriented to person, place, and time. Patient appears well-developed and well-nourished. Patient is active and cooperative.  Non-toxic appearance. No distress.   Neck: Trachea normal and normal range of motion. No JVD present.   Pulmonary/Chest: Effort normal. No accessory muscle usage or stridor. No apnea. No respiratory distress.   Cardiovascular: Normal rate and no JVD.   Abdominal: Normal appearance. Patient exhibits no distension. Abdomen is soft, obese, non tender.   Musculoskeletal: Normal range of motion. Patient exhibits no edema.   Neurological: Patient is alert and oriented to person, place, and time. Patient has normal strength. GCS eye subscore is 4. GCS verbal subscore is 5. GCS motor subscore is 6.   Skin: Skin is warm and dry. No abrasion and no rash noted. Patient is not diaphoretic. No cyanosis or erythema.   Psychiatric: Patient has a normal mood and affect. Speech is normal and behavior is normal. Cognition and memory are normal.       A/P    Eva Rowell is 44 y.o. female, Body mass index is 43.77 kg/m². pre surgery, has lost 3 lbs since last visit. The patient underwent dietary

## 2025-03-06 NOTE — PROGRESS NOTES
MD MONI Muñoz Board Certified in Sleep Medicine  Certified in Behavioral Sleep Medicine  Board Certified in Neurology [facility]  [address]   [address]  [place]  [address]  [phone]   [facility]  [address]  [address]  [place], [place] [address]                      [facility] PHYSICIANS [place] [facility] SLEEP MEDICINE WEST  [address] [address] [phone]    Subjective:     Patient ID: Eva Rowell is a 44 y.o. female.    Chief Complaint   Patient presents with    Follow-up    Sleep Apnea       HPI:        Eva Rowell is a 44 y.o. female was seen today as a second follow for moderate obstructive sleep apnea with apnea hypopnea index of 29.9/hr with lowest O2 saturation of 81%, patient spent about 294.8 minutes below 90% (weight was 257 pounds).   Patient is using the PAP machine about 66% of the time, more than 4 hours a night about  41 %, in total average of 5:06 hours a night in last 90 days.  Could not use every night and has issue with breathing out  Currently on PAP at 11 cm (), the AHI is only 1.9 events per hour at this pressure.    The Patient scored [place] Sleepiness Score: 13 on [place] Sleepiness Scale ( more than 10 is indicative of daytime sleepiness)     BP is stable. Has gained 10 pounds since last visit.   DOT/CDL - N/A      Previous Report(s)Reviewed: historical medical records         Social History     Socioeconomic History    Marital status:      Spouse name: Not on file    Number of children: Not on file    Years of education: Not on file    Highest education level: Not on file   Occupational History    Not on file   Tobacco Use    Smoking status: Never     Passive exposure: Never    Smokeless tobacco: Never   Vaping Use    Vaping status: Never Used   Substance and Sexual Activity    Alcohol use: No    Drug use: No    Sexual activity: Not on

## 2025-03-06 NOTE — PATIENT INSTRUCTIONS
Orders Placed This Encounter   Procedures    Sleep Study with PAP Titration     Standing Status:   Future     Standing Expiration Date:   3/6/2026     Scheduling Instructions:      Failed the CPAP     Order Specific Question:   Sleep Study Titration Type     Answer:   BIPAP     Order Specific Question:   Location For Sleep Study     Answer:   [facility]     Order Specific Question:   Select Sleep Lab Location     Answer:   [facility]

## 2025-03-07 ENCOUNTER — PREP FOR PROCEDURE (OUTPATIENT)
Dept: BARIATRICS/WEIGHT MGMT | Age: 45
End: 2025-03-07

## 2025-03-07 PROBLEM — K21.9 GASTROESOPHAGEAL REFLUX DISEASE: Status: ACTIVE | Noted: 2025-03-07

## 2025-03-13 ENCOUNTER — HOSPITAL ENCOUNTER (OUTPATIENT)
Dept: SLEEP CENTER | Age: 45
Discharge: HOME | End: 2025-03-13
Payer: COMMERCIAL

## 2025-03-13 DIAGNOSIS — G47.33 OSA (OBSTRUCTIVE SLEEP APNEA): ICD-10-CM

## 2025-03-13 PROCEDURE — 95811 POLYSOM 6/>YRS CPAP 4/> PARM: CPT

## 2025-03-17 ENCOUNTER — TELEPHONE (OUTPATIENT)
Dept: PULMONOLOGY | Age: 45
End: 2025-03-17

## 2025-03-17 NOTE — PROGRESS NOTES
Eva MARIAH Rowell         : 1980  [] [facility]     [] [facility]      [] [facility]jinder     [][facility]    [] [facility]  [] [facility]   [] [facility]  [] [facility] [] [employer] [] [facility] [] [facility] [] [facility] [] [facility]  Diagnosis: [x] KIMO (G47.33) [] CSA (G47.31) [] Apnea (G47.30)   Length of Need: [] 12 Months [x] 99 Months [] Other:    Machine (MARICRUZ!):  [x] ResMed AirSense     Auto [] Other:     []  CPAP () [x] Bilevel ()   Mode: [] Auto [] Spontaneous    Mode: [] Auto [] Spontaneous                     16/12 cm        Comfort Settings:     If the order for CPAP  - Ramp Pressure: 5 cmH2O                                        - Ramp time: 15 min                                     -  Flex/EPR - 3 full time       Humidifier: [x] Heated ()        [x] Water chamber replacement ()/ 1 per 6 months        Mask:  Please always start with the mask the patient used during the titraion   [x] Nasal () /1 per 3 months    [x] Patient choice -Size and fit mask    [] Dispense: small Airfit P10 nasal pillows     [x] Headgear () / 1 per 6 months        [x] Replacement Nasal Pillows ()/2 per month         Tubing: [x] Heated ()/1 per 3 months    [] Standard ()/1 per 3 months [] Other:           Filters: [x] Non-disposable ()/1 per 6 months     [x] Ultra-Fine, Disposable ()/2 per month        Miscellaneous: [x] Chin Strap ()/ 1 per 6 months [] O2 bleed-in:       LPM   [] Oximetry on CPAP/Bilevel []  Other:          Start Order Date: 25    MEDICAL JUSTIFICATION:  I, the undersigned, certify that the above prescribed supplies are medically necessary for this patient’s wellbeing.  In my opinion, the supplies are both reasonable and necessary in reference to accepted standards of medicalpractice in treatment of this patient’s condition.    Mike Muñoz MD      NPI: [id number]       Order Signed Date: 25    Electronically

## 2025-03-17 NOTE — TELEPHONE ENCOUNTER
Titration study shows adequate control of the events at a BiPAP 16/12 cm.      DME: [facility] - faxed     Patient will need appointment 31-90 days after starting new machine    The patient has been notified of this information and all questions answered.

## 2025-04-11 NOTE — PROGRESS NOTES
ENDOSCOPY PREOP INSTRUCTIONS      Please at arrival time given to you from your doctor's office.  Report to the MAIN entrance on [address] and register at the surgery center on the left-hand side of the lobby  You will need your insurance card and photo id and a list of all medications taken on a regular basis. Please include the dose/frequency.    For your procedure:     PLEASE FOLLOW ALL INSTRUCTIONS & PREPS GIVEN TO YOU BY YOUR DOCTOR'S OFFICE.    Please make sure you bring the name of bowel prep & any meds you took prior to arrival.  If you have not received these instructions yet, please call the office immediately. Make sure to read them as soon as received.   If you are taking blood thinners, Aspirin or diabetic medication, make sure to call your doctor as soon as possible for instructions prior to your procedure.  Please dress comfortably and do not wear any lotion, powders or jewelry  If you use oxygen at home, please bring your oxygen tank with you to hospital.  Arrange for someone to be with you and sign you out & drive you home after your procedure.  THIS PERSON MUST WAIT AT HOSPITAL THE ENTIRE TIME.  WOMEN ONLY OF CHILDBEARING AGE: Please make sure to be able to give a urine sample on arrival      If you have further questions, you may contact your Endoscopist's office

## 2025-04-14 ENCOUNTER — ANESTHESIA (OUTPATIENT)
Dept: ENDOSCOPY | Age: 45
End: 2025-04-14
Payer: COMMERCIAL

## 2025-04-14 ENCOUNTER — ANESTHESIA EVENT (OUTPATIENT)
Dept: ENDOSCOPY | Age: 45
End: 2025-04-14
Payer: COMMERCIAL

## 2025-04-14 ENCOUNTER — APPOINTMENT (OUTPATIENT)
Dept: ENDOSCOPY | Age: 45
End: 2025-04-14
Attending: SURGERY
Payer: COMMERCIAL

## 2025-04-14 ENCOUNTER — HOSPITAL ENCOUNTER (OUTPATIENT)
Age: 45
Setting detail: OUTPATIENT SURGERY
Discharge: HOME | End: 2025-04-14
Attending: SURGERY | Admitting: SURGERY
Payer: COMMERCIAL

## 2025-04-14 VITALS
SYSTOLIC BLOOD PRESSURE: 150 MMHG | BODY MASS INDEX: 43.82 KG/M2 | HEIGHT: 65 IN | RESPIRATION RATE: 16 BRPM | TEMPERATURE: 96.8 F | DIASTOLIC BLOOD PRESSURE: 98 MMHG | HEART RATE: 60 BPM | WEIGHT: 263 LBS | OXYGEN SATURATION: 100 %

## 2025-04-14 DIAGNOSIS — K21.9 GASTROESOPHAGEAL REFLUX DISEASE: ICD-10-CM

## 2025-04-14 PROCEDURE — 3609012400 HC EGD TRANSORAL BIOPSY SINGLE/MULTIPLE: Performed by: SURGERY

## 2025-04-14 PROCEDURE — 3700000000 HC ANESTHESIA ATTENDED CARE: Performed by: SURGERY

## 2025-04-14 PROCEDURE — 2709999900 HC NON-CHARGEABLE SUPPLY: Performed by: SURGERY

## 2025-04-14 PROCEDURE — 3700000001 HC ADD 15 MINUTES (ANESTHESIA): Performed by: SURGERY

## 2025-04-14 PROCEDURE — 2580000003 HC RX 258: Performed by: ANESTHESIOLOGY

## 2025-04-14 PROCEDURE — 7100000011 HC PHASE II RECOVERY - ADDTL 15 MIN: Performed by: SURGERY

## 2025-04-14 PROCEDURE — 88305 TISSUE EXAM BY PATHOLOGIST: CPT

## 2025-04-14 PROCEDURE — 6360000002 HC RX W HCPCS: Performed by: NURSE ANESTHETIST, CERTIFIED REGISTERED

## 2025-04-14 PROCEDURE — 2580000003 HC RX 258: Performed by: NURSE ANESTHETIST, CERTIFIED REGISTERED

## 2025-04-14 PROCEDURE — 7100000010 HC PHASE II RECOVERY - FIRST 15 MIN: Performed by: SURGERY

## 2025-04-14 RX ORDER — PROPOFOL 10 MG/ML
INJECTION, EMULSION INTRAVENOUS
Status: DISCONTINUED | OUTPATIENT
Start: 2025-04-14 | End: 2025-04-14 | Stop reason: SDUPTHER

## 2025-04-14 RX ORDER — LIDOCAINE HCL/PF 100 MG/5ML
SYRINGE (ML) INJECTION
Status: DISCONTINUED | OUTPATIENT
Start: 2025-04-14 | End: 2025-04-14 | Stop reason: SDUPTHER

## 2025-04-14 RX ORDER — SODIUM CHLORIDE, SODIUM LACTATE, POTASSIUM CHLORIDE, CALCIUM CHLORIDE 600; 310; 30; 20 MG/100ML; MG/100ML; MG/100ML; MG/100ML
INJECTION, SOLUTION INTRAVENOUS
Status: DISCONTINUED | OUTPATIENT
Start: 2025-04-14 | End: 2025-04-14 | Stop reason: SDUPTHER

## 2025-04-14 RX ORDER — SODIUM CHLORIDE, SODIUM LACTATE, POTASSIUM CHLORIDE, CALCIUM CHLORIDE 600; 310; 30; 20 MG/100ML; MG/100ML; MG/100ML; MG/100ML
INJECTION, SOLUTION INTRAVENOUS CONTINUOUS
Status: DISCONTINUED | OUTPATIENT
Start: 2025-04-14 | End: 2025-04-14 | Stop reason: HOSPADM

## 2025-04-14 RX ADMIN — SODIUM CHLORIDE, SODIUM LACTATE, POTASSIUM CHLORIDE, AND CALCIUM CHLORIDE: .6; .31; .03; .02 INJECTION, SOLUTION INTRAVENOUS at 08:00

## 2025-04-14 RX ADMIN — SODIUM CHLORIDE, SODIUM LACTATE, POTASSIUM CHLORIDE, AND CALCIUM CHLORIDE: .6; .31; .03; .02 INJECTION, SOLUTION INTRAVENOUS at 07:24

## 2025-04-14 RX ADMIN — Medication 100 MG: at 08:05

## 2025-04-14 RX ADMIN — PROPOFOL 150 MG: 10 INJECTION, EMULSION INTRAVENOUS at 08:06

## 2025-04-14 ASSESSMENT — PAIN SCALES - GENERAL
PAINLEVEL_OUTOF10: 0

## 2025-04-14 ASSESSMENT — PAIN - FUNCTIONAL ASSESSMENT
PAIN_FUNCTIONAL_ASSESSMENT: 0-10
PAIN_FUNCTIONAL_ASSESSMENT: ACTIVITIES ARE NOT PREVENTED

## 2025-04-14 ASSESSMENT — PAIN DESCRIPTION - DESCRIPTORS: DESCRIPTORS: ACHING;THROBBING

## 2025-04-14 NOTE — DISCHARGE INSTRUCTIONS
-ENDOSCOPY DISCHARGE INSTRUCTIONS:    Call the physician that did your procedure for any questions or concerns:     DR. MCGUIRE:  [phone]       ACTIVITY:    There are potential side effects from the medications used for sedation and anesthesia during your procedure.  These include:  Dizziness or light-headedness, confusion or memory loss, delayed reaction times, loss of coordination, nausea and vomiting.  Because of your increased risk for injury, we ask that you observe the following precautions:  For the next 24 hours,  DO NOT operate an automobile, bicycle, motorcycle, , power tools or large equipment of any kind.  Do not drink alcohol, sign any legal documents or make any legal decisions for 24 hours.  Do not bend your head over lower than your heart.  DO sit on the side of bed/couch awhile before getting up.  Plan on bedrest or quiet relaxation today.  You may resume normal activities in 24 hours.      DIET:    Your first meal today should be light, avoiding spicy and fatty foods.  If you tolerate this first meal, then you may advance to your regular diet unless otherwise advised by your physician.    NOTIFY YOUR PHYSICIAN IF THESE SYMPTOMS OCCUR:  1. Fever (greater than 100)  5. Increased abdominal bloating  2. Severe pain    6. Excessive bleeding  3. Nausea and vomiting  7. Chest pain                                                                    4. Chills    8. Shortness of breath    NORMAL SYMPTOMS:  1. Mild sore throat  2. Gaseous discomfort                 ADDITIONAL INSTRUCTIONS:  If you are on aspirin and stopped prior to procedure, you may resume aspirin in 24 hours, unless otherwise instructed.    Biopsy results: TO BE DISCUSSED AT YOUR NEXT APPOINTMENT TIME    Educational Information:     Follow up appointment:  KEEP APPOINTMENT YOU ALREADY HAVE WITH DR. MCGUIRE                       Please review these discharge instructions this evening or tomorrow for   information you may have

## 2025-04-14 NOTE — ANESTHESIA POSTPROCEDURE EVALUATION
Department of Anesthesiology  Postprocedure Note    Patient: Eva Rowell  MRN: [id number]  YOB: 1980  Date of evaluation: 4/14/2025    Procedure Summary       Date: 04/14/25 Room / Location: [place] [address] / [facility]    Anesthesia Start: 0800 Anesthesia Stop: 0818    Procedure: ESOPHAGOGASTRODUODENOSCOPY BIOPSY Diagnosis:       Gastroesophageal reflux disease      (Gastroesophageal reflux disease [K21.9])    Surgeons: Ajit Sawant DO Responsible Provider: Dell Jordan MD    Anesthesia Type: general ASA Status: 3            Anesthesia Type: No value filed.    Elle Phase I: Elle Score: 10    Elel Phase II:      Anesthesia Post Evaluation    Patient location during evaluation: PACU  Patient participation: complete - patient participated  Level of consciousness: awake and alert  Pain score: 0  Airway patency: patent  Nausea & Vomiting: no nausea and no vomiting  Cardiovascular status: hemodynamically stable  Respiratory status: acceptable  Hydration status: euvolemic  Pain management: adequate    No notable events documented.

## 2025-04-14 NOTE — ANESTHESIA PRE PROCEDURE
• Sleep apnea, obstructive        Past Surgical History:        Procedure Laterality Date   • HYSTERECTOMY (CERVIX STATUS UNKNOWN)     • PARATHYROIDECTOMY Right 05/11/2009    right inferior       Social History:    Social History     Tobacco Use   • Smoking status: Never     Passive exposure: Never   • Smokeless tobacco: Never   Substance Use Topics   • Alcohol use: No                                Counseling given: Not Answered      Vital Signs (Current):   Vitals:    04/14/25 0708   BP: (!) 161/96   Pulse: 61   Resp: 18   Temp: 97 °F (36.1 °C)   TempSrc: Temporal   SpO2: 96%   Weight: 75.8 kg (167 lb)   Height: 1.651 m (5' 5\")                                              BP Readings from Last 3 Encounters:   04/14/25 (!) 161/96   03/06/25 (!) 170/95   03/06/25 (!) 188/113       NPO Status: Time of last liquid consumption: 2315                        Time of last solid consumption: 1800                        Date of last liquid consumption: 04/13/25                        Date of last solid food consumption: 04/13/25    BMI:   Wt Readings from Last 3 Encounters:   04/14/25 75.8 kg (167 lb)   03/06/25 119.6 kg (263 lb 9.6 oz)   03/06/25 119.3 kg (263 lb)     Body mass index is 27.79 kg/m².    CBC:   Lab Results   Component Value Date/Time    WBC 4.7 01/02/2025 03:19 PM    RBC 4.33 01/02/2025 03:19 PM    HGB 12.5 01/02/2025 03:19 PM    HCT 38.8 01/02/2025 03:19 PM    MCV 89.5 01/02/2025 03:19 PM    RDW 14.7 01/02/2025 03:19 PM     01/02/2025 03:19 PM       CMP:   Lab Results   Component Value Date/Time     01/02/2025 03:19 PM    K 4.1 01/02/2025 03:19 PM     01/02/2025 03:19 PM    CO2 26 01/02/2025 03:19 PM    BUN 11 01/02/2025 03:19 PM    CREATININE 0.6 01/02/2025 03:19 PM    GFRAA >60 05/02/2022 09:13 PM    AGRATIO 1.6 01/02/2025 03:19 PM    LABGLOM >90 01/02/2025 03:19 PM    GLUCOSE 79 01/02/2025 03:19 PM    CALCIUM 9.5 01/02/2025 03:19 PM    BILITOT 0.4 01/02/2025 03:19 PM    ALKPHOS 97

## 2025-04-14 NOTE — PROGRESS NOTES
Ambulatory Surgery/Procedure Discharge Note    Vitals:    04/14/25 0833   BP: (!) 150/98   Pulse: 60   Resp: 16   Temp:    SpO2: 100%       In: 100 [I.V.:100]  Out: -     Restroom use offered before discharge.  Yes    Pain assessment:  none  Pain Level: 0    Pt a&o x4. BP within 20% of baseline BP. Pt denies pain and nausea. Reviewed d/c instructions and removed IV.      Patient discharged to home/self care. Patient discharged via wheel chair by transporter to waiting family/S.O.       4/14/2025 10:32 AM

## 2025-04-14 NOTE — H&P
[facility]   General & Laparoscopic Surgery  [facility]        HPI:     Eva Rowell is a very pleasant 44 y.o. female with Body mass index is 43.77 kg/m².  , Pre-Surgery.           Past Medical History        Past Medical History:   Diagnosis Date    Asthma      Chronic gastroesophageal reflux disease without esophagitis      Hypertension, essential      Hyperthyroidism      Metabolic syndrome      Migraines      Morbid obesity with BMI of 40.0-44.9, adult      PCOS (polycystic ovarian syndrome)      Sleep apnea, obstructive           Past Surgical History         Past Surgical History:   Procedure Laterality Date    HYSTERECTOMY (CERVIX STATUS UNKNOWN)        PARATHYROIDECTOMY Right 05/11/2009     right inferior         Family History         Family History   Problem Relation Age of Onset    Hypertension Mother      Coronary Art Dis Mother      Coronary Art Dis Father      Hypertension Father      Clotting Disorder Father      COPD Maternal Grandmother      Cancer Maternal Grandmother      Stroke Maternal Grandmother      Hypertension Maternal Grandmother      Glaucoma Maternal Grandmother      Asthma Brother      Migraines Brother      Seizures Brother      Alcohol Abuse Maternal Uncle           Social History            Tobacco Use    Smoking status: Never       Passive exposure: Never    Smokeless tobacco: Never   Substance Use Topics    Alcohol use: No      I counseled the patient on the importance of not smoking and risks of ETOH.   Allergies   No Known Allergies        Body mass index is 43.77 kg/m².       Current Medication      Current Outpatient Medications:     vitamin D (ERGOCALCIFEROL) 1.25 MG (49320 UT) CAPS capsule, Take 1 capsule by mouth once a week, Disp: 4 capsule, Rfl: 4    metoprolol succinate (TOPROL XL) 100 MG extended release tablet, Take 1 tablet by mouth daily, Disp: 30 tablet, Rfl: 3    NIFEdipine (NIFEDICAL XL) 60 MG extended release tablet, Take 1

## 2025-04-15 NOTE — OP NOTE
Esophagogastroduodenoscopy with biopsy    Indications: Pre-op gastric Surgery, rule out Gastroesophageal reflux disease.    Pre-operative Diagnosis: Obesity/pre-op bariatric surgery .    Post-operative Diagnosis: Hiatal Hernia Hill Grade 2    Surgeon: Ajit Sawant    Anesthesia: MAC      Procedure Details   The patient was seen in the Holding Room. The risks, benefits, complications, treatment options, and expected outcomes were discussed with the patient. Pre-op Endoscopy recommended to rule any intragastric pathology that would interfere with proposed procedure /  And or due to current conditions. The possibilities of reaction to medication, pulmonary aspiration, perforation of viscus, bleeding, recurrent infection, the need for additional procedures, failure to diagnose a condition, and creating a complication requiring transfusion or operation were discussed with the patient. The patient concurred with the proposed plan, giving informed consent.  The site of surgery properly noted/marked. The patient was taken to Endoscopy Suite, identified and the procedure verified as  Esophagogastroduodenoscopy  A Time Out was held and the above information confirmed.      Upper Endoscopy:    An endoscope was inserted through the oropharynx into the upper esophagus. The endoscope was passed into the stomach to the level of the pylorus and passed to the duodenum where the ampulla was visualized and duodenum was normal. Then the scope was retracted back to the stomach and it was normal, biopsy of the antrum obtained for H. Pylori, then retroflexed and the gastroesophageal junction was inspected. There was a hiatal hernia and no evidence of Moser's     Findings:  Esophageal findings include:  Upper: normal Esophageal Mucosa  Lower:normal Esophageal Mucosa  Distal: normal Esophageal Mucosa    Gastric findings include: normal Gastric Mucosa    Duodenal Findings: normal Duodenal Mucosa    Estimated Blood Loss:  none    Biopsy:

## 2025-05-09 ENCOUNTER — CLINICAL DOCUMENTATION (OUTPATIENT)
Dept: BARIATRICS/WEIGHT MGMT | Age: 45
End: 2025-05-09

## 2025-05-09 NOTE — PROGRESS NOTES
Eva Rowell gained 5 lbs over 2 mos. Self-reported wt: 268lbs. States she noticed change in body composition, but not loss.     Is pt eating at least 4 times everyday? yes   Tracking   B: protein shake  S: rice crispy + PB  L: pasta salad + vinaigrette  S: none  D: chx + baked beans + mashed potatoes   S: broccoli OR none OR fruit     Is pt eating a lean protein source with all meals and snacks? Mostly     Has pt decreased their portions using the plate method? yes      Is pt choosing low fat/sugar free options? yes asked about some foods -      Is pt drinking at least 64 oz of clear liquids everyday? yes 20oz water X8    Has pt stopped drinking carbonation, caffeinated, and sugar sweetened beverages? yes      Has pt sampled Unjury and/or Nectar protein?  Has tried and tolerated     Has pt attended a support group? Completed    Participating in intentional exercise? yes stairs daily 1 hour    Plan/Recommendations:   - Focus on protein at all meals and snacks, add in serving with lunch    Handouts: protein snack, cooking resources     PILY TSAI, MS, RD, LD

## 2025-05-14 ENCOUNTER — OFFICE VISIT (OUTPATIENT)
Dept: SLEEP MEDICINE | Age: 45
End: 2025-05-14
Payer: COMMERCIAL

## 2025-05-14 VITALS
HEIGHT: 65 IN | TEMPERATURE: 97.4 F | BODY MASS INDEX: 44.15 KG/M2 | SYSTOLIC BLOOD PRESSURE: 132 MMHG | WEIGHT: 265 LBS | DIASTOLIC BLOOD PRESSURE: 70 MMHG | HEART RATE: 75 BPM | RESPIRATION RATE: 18 BRPM | OXYGEN SATURATION: 95 %

## 2025-05-14 DIAGNOSIS — I10 HYPERTENSION, ESSENTIAL: ICD-10-CM

## 2025-05-14 DIAGNOSIS — G47.33 OSA TREATED WITH BIPAP: Primary | ICD-10-CM

## 2025-05-14 DIAGNOSIS — Z99.89 DEPENDENCE ON OTHER ENABLING MACHINES AND DEVICES: ICD-10-CM

## 2025-05-14 DIAGNOSIS — E66.01 MORBID OBESITY WITH BMI OF 40.0-44.9, ADULT (HCC): ICD-10-CM

## 2025-05-14 PROCEDURE — 3075F SYST BP GE 130 - 139MM HG: CPT | Performed by: PSYCHIATRY & NEUROLOGY

## 2025-05-14 PROCEDURE — 3078F DIAST BP <80 MM HG: CPT | Performed by: PSYCHIATRY & NEUROLOGY

## 2025-05-14 PROCEDURE — 99214 OFFICE O/P EST MOD 30 MIN: CPT | Performed by: PSYCHIATRY & NEUROLOGY

## 2025-05-14 PROCEDURE — G2211 COMPLEX E/M VISIT ADD ON: HCPCS | Performed by: PSYCHIATRY & NEUROLOGY

## 2025-05-14 ASSESSMENT — SLEEP AND FATIGUE QUESTIONNAIRES
HOW LIKELY ARE YOU TO NOD OFF OR FALL ASLEEP WHEN YOU ARE A PASSENGER IN A CAR FOR AN HOUR WITHOUT A BREAK: HIGH CHANCE OF DOZING
HOW LIKELY ARE YOU TO NOD OFF OR FALL ASLEEP WHILE LYING DOWN TO REST IN THE AFTERNOON WHEN CIRCUMSTANCES PERMIT: HIGH CHANCE OF DOZING
HOW LIKELY ARE YOU TO NOD OFF OR FALL ASLEEP WHILE SITTING INACTIVE IN A PUBLIC PLACE: MODERATE CHANCE OF DOZING
HOW LIKELY ARE YOU TO NOD OFF OR FALL ASLEEP WHILE SITTING QUIETLY AFTER LUNCH WITHOUT ALCOHOL: HIGH CHANCE OF DOZING
HOW LIKELY ARE YOU TO NOD OFF OR FALL ASLEEP WHILE SITTING AND READING: SLIGHT CHANCE OF DOZING
HOW LIKELY ARE YOU TO NOD OFF OR FALL ASLEEP WHEN YOU ARE A PASSENGER IN A CAR FOR AN HOUR WITHOUT A BREAK: HIGH CHANCE OF DOZING
HOW LIKELY ARE YOU TO NOD OFF OR FALL ASLEEP WHILE SITTING INACTIVE IN A PUBLIC PLACE: MODERATE CHANCE OF DOZING
HOW LIKELY ARE YOU TO NOD OFF OR FALL ASLEEP WHILE WATCHING TV: HIGH CHANCE OF DOZING
HOW LIKELY ARE YOU TO NOD OFF OR FALL ASLEEP WHILE SITTING AND TALKING TO SOMEONE: MODERATE CHANCE OF DOZING
HOW LIKELY ARE YOU TO NOD OFF OR FALL ASLEEP WHILE SITTING AND TALKING TO SOMEONE: MODERATE CHANCE OF DOZING
HOW LIKELY ARE YOU TO NOD OFF OR FALL ASLEEP WHILE LYING DOWN TO REST IN THE AFTERNOON WHEN CIRCUMSTANCES PERMIT: HIGH CHANCE OF DOZING
HOW LIKELY ARE YOU TO NOD OFF OR FALL ASLEEP WHILE WATCHING TV: HIGH CHANCE OF DOZING
HOW LIKELY ARE YOU TO NOD OFF OR FALL ASLEEP IN A CAR, WHILE STOPPED FOR A FEW MINUTES IN TRAFFIC: WOULD NEVER DOZE
HOW LIKELY ARE YOU TO NOD OFF OR FALL ASLEEP IN A CAR, WHILE STOPPED FOR A FEW MINUTES IN TRAFFIC: WOULD NEVER DOZE
HOW LIKELY ARE YOU TO NOD OFF OR FALL ASLEEP WHILE SITTING AND READING: SLIGHT CHANCE OF DOZING
ESS TOTAL SCORE: 17
HOW LIKELY ARE YOU TO NOD OFF OR FALL ASLEEP WHILE SITTING QUIETLY AFTER LUNCH WITHOUT ALCOHOL: HIGH CHANCE OF DOZING

## 2025-05-14 NOTE — PROGRESS NOTES
MD MONI Muñoz Board Certified in Sleep Medicine  Certified in Behavioral Sleep Medicine  Board Certified in Neurology [facility]  [address]   [address]  [place]  [address]  [phone]   [facility]  [address]  [address]  [place], [place] [address]                      [facility] PHYSICIANS [place] [facility] SLEEP MEDICINE WEST  [address] [address] [phone]    Subjective:     Patient ID: Eva Rowell is a 44 y.o. female.    Chief Complaint   Patient presents with    Follow-up       HPI:        Eva Rowell is a 44 y.o. female was seen today as a follow for  moderate obstructive sleep apnea with apnea hypopnea index of 29.9/hr with lowest O2 saturation of 81%, patient spent about 294.8 minutes below 90% (weight was 257 pounds). Had BiPAP titration   Patient is using the PAP machine about 60% of the time, more than 4 hours a night about  50 %, in total average of 5 hours a night in last 12 days.  Currently on BiPAP at 16/12 cm ,   Patient improved regarding daytime sleepiness and fatigue, wakes up refreshed in the morning.  The Patient scored [place] Sleepiness Score: (Patient-Rptd) 17 on [place] Sleepiness Scale ( more than 10 is indicative of daytime sleepiness)   Patient has no problem with PAP pressure or mask, uses nasal pillow.    BP is stable. Has gained 5 pounds since last visit. 263  DOT/CDL - N/A      Previous Report(s)Reviewed: historical medical records         Social History     Socioeconomic History    Marital status: Legally      Spouse name: Not on file    Number of children: Not on file    Years of education: Not on file    Highest education level: Not on file   Occupational History    Not on file   Tobacco Use    Smoking status: Never     Passive exposure: Never    Smokeless tobacco: Never   Vaping Use    Vaping status: Never Used   Substance and

## 2025-06-11 ENCOUNTER — OFFICE VISIT (OUTPATIENT)
Dept: SLEEP MEDICINE | Age: 45
End: 2025-06-11
Payer: COMMERCIAL

## 2025-06-11 VITALS
OXYGEN SATURATION: 97 % | BODY MASS INDEX: 44.02 KG/M2 | RESPIRATION RATE: 18 BRPM | HEART RATE: 67 BPM | SYSTOLIC BLOOD PRESSURE: 130 MMHG | DIASTOLIC BLOOD PRESSURE: 70 MMHG | TEMPERATURE: 97.8 F | HEIGHT: 65 IN | WEIGHT: 264.2 LBS

## 2025-06-11 DIAGNOSIS — Z99.89 DEPENDENCE ON OTHER ENABLING MACHINES AND DEVICES: ICD-10-CM

## 2025-06-11 DIAGNOSIS — E66.01 MORBID OBESITY WITH BMI OF 40.0-44.9, ADULT (HCC): ICD-10-CM

## 2025-06-11 DIAGNOSIS — I10 HYPERTENSION, ESSENTIAL: ICD-10-CM

## 2025-06-11 DIAGNOSIS — G47.33 OSA TREATED WITH BIPAP: Primary | ICD-10-CM

## 2025-06-11 PROCEDURE — G2211 COMPLEX E/M VISIT ADD ON: HCPCS | Performed by: PSYCHIATRY & NEUROLOGY

## 2025-06-11 PROCEDURE — 3078F DIAST BP <80 MM HG: CPT | Performed by: PSYCHIATRY & NEUROLOGY

## 2025-06-11 PROCEDURE — 3075F SYST BP GE 130 - 139MM HG: CPT | Performed by: PSYCHIATRY & NEUROLOGY

## 2025-06-11 PROCEDURE — 99214 OFFICE O/P EST MOD 30 MIN: CPT | Performed by: PSYCHIATRY & NEUROLOGY

## 2025-06-11 ASSESSMENT — SLEEP AND FATIGUE QUESTIONNAIRES
HOW LIKELY ARE YOU TO NOD OFF OR FALL ASLEEP WHILE SITTING INACTIVE IN A PUBLIC PLACE: SLIGHT CHANCE OF DOZING
HOW LIKELY ARE YOU TO NOD OFF OR FALL ASLEEP WHILE SITTING AND READING: MODERATE CHANCE OF DOZING
HOW LIKELY ARE YOU TO NOD OFF OR FALL ASLEEP WHILE WATCHING TV: HIGH CHANCE OF DOZING
HOW LIKELY ARE YOU TO NOD OFF OR FALL ASLEEP WHILE SITTING AND TALKING TO SOMEONE: MODERATE CHANCE OF DOZING
HOW LIKELY ARE YOU TO NOD OFF OR FALL ASLEEP WHILE SITTING QUIETLY AFTER LUNCH WITHOUT ALCOHOL: MODERATE CHANCE OF DOZING
HOW LIKELY ARE YOU TO NOD OFF OR FALL ASLEEP IN A CAR, WHILE STOPPED FOR A FEW MINUTES IN TRAFFIC: WOULD NEVER DOZE
HOW LIKELY ARE YOU TO NOD OFF OR FALL ASLEEP WHILE LYING DOWN TO REST IN THE AFTERNOON WHEN CIRCUMSTANCES PERMIT: HIGH CHANCE OF DOZING
HOW LIKELY ARE YOU TO NOD OFF OR FALL ASLEEP WHEN YOU ARE A PASSENGER IN A CAR FOR AN HOUR WITHOUT A BREAK: HIGH CHANCE OF DOZING
ESS TOTAL SCORE: 16

## 2025-06-11 NOTE — PROGRESS NOTES
Yes Faviola Rooney MD       Allergies as of 06/11/2025    (No Known Allergies)       Patient Active Problem List   Diagnosis    Hyperparathyroidism    Hypercalcemia    KIMO (obstructive sleep apnea)    Hypertension, essential    Metabolic syndrome    Chronic gastroesophageal reflux disease without esophagitis    Hyperthyroidism    Morbid obesity with BMI of 40.0-44.9, adult (HCC)    Gastroesophageal reflux disease       Past Medical History:   Diagnosis Date    Asthma     Chronic gastroesophageal reflux disease without esophagitis     Hypertension, essential     Hyperthyroidism     Metabolic syndrome     Migraines     Morbid obesity with BMI of 40.0-44.9, adult (HCC)     PCOS (polycystic ovarian syndrome)     Sleep apnea, obstructive        Past Surgical History:   Procedure Laterality Date    HYSTERECTOMY (CERVIX STATUS UNKNOWN)      PARATHYROIDECTOMY Right 05/11/2009    right inferior    UPPER GASTROINTESTINAL ENDOSCOPY N/A 4/14/2025    ESOPHAGOGASTRODUODENOSCOPY BIOPSY performed by Ajit Sawant DO at [facility] ENDOSCOPY       Family History   Problem Relation Age of Onset    Hypertension Mother     Coronary Art Dis Mother     Coronary Art Dis Father     Hypertension Father     Clotting Disorder Father     COPD Maternal Grandmother     Cancer Maternal Grandmother     Stroke Maternal Grandmother     Hypertension Maternal Grandmother     Glaucoma Maternal Grandmother     Asthma Brother     Migraines Brother     Seizures Brother     Alcohol Abuse Maternal Uncle        Review of Systems    Objective:     Vitals:  Weight BMI Neck circumference    Wt Readings from Last 3 Encounters:   06/11/25 119.8 kg (264 lb 3.2 oz)   05/14/25 120.2 kg (265 lb)   05/08/25 121.6 kg (268 lb)    Body mass index is 43.97 kg/m².       BP HR SaO2   BP Readings from Last 3 Encounters:   06/11/25 130/70   05/14/25 132/70   05/08/25 (!) 170/100    Pulse Readings from Last 3 Encounters:   06/11/25 67   05/14/25 75   05/08/25 78    SpO2 Readings

## 2025-07-01 ENCOUNTER — CLINICAL DOCUMENTATION (OUTPATIENT)
Dept: BARIATRICS/WEIGHT MGMT | Age: 45
End: 2025-07-01

## 2025-07-01 NOTE — PROGRESS NOTES
This eval was conducted via phone on 7/1/25 in preparation or their pre-surgical visit on 7/3/25 with Dr. Sawant.     Eva Rowell no new wt to report.    Is pt eating at least 4 times everyday? no  B: none  L: salad OR chx   S: none  D: none OR broccoli/brussels   S: none    Is pt eating a lean protein source with all meals and snacks? no    Has pt decreased their portions using the plate method? yes reports decreased     Is pt choosing low fat/sugar free options? yes    Is pt drinking at least 64 oz of clear liquids everyday? yes  water 4 bottles;     Has pt stopped drinking carbonation, caffeinated, and sugar sweetened beverages? no 2 sweet tea per day    Has pt sampled Unjury and/or Nectar protein? yes      Has pt attended a support group? Completed    Participating in intentional exercise? Walks at work    Plan/Recommendations:   - Opt for diet/sugar free noncaf tea/cystal light  - Aim for protein at all meals and snacks  - Aim for 3-5 meals and snacks per day    Handouts: protein shake/bars    PILY TSAI, RD

## 2025-07-03 ENCOUNTER — OFFICE VISIT (OUTPATIENT)
Dept: BARIATRICS/WEIGHT MGMT | Age: 45
End: 2025-07-03
Payer: COMMERCIAL

## 2025-07-03 VITALS
DIASTOLIC BLOOD PRESSURE: 100 MMHG | WEIGHT: 261 LBS | SYSTOLIC BLOOD PRESSURE: 178 MMHG | BODY MASS INDEX: 43.49 KG/M2 | HEART RATE: 72 BPM | HEIGHT: 65 IN

## 2025-07-03 DIAGNOSIS — I10 HYPERTENSION, ESSENTIAL: ICD-10-CM

## 2025-07-03 DIAGNOSIS — G47.33 OSA ON CPAP: ICD-10-CM

## 2025-07-03 DIAGNOSIS — K21.9 CHRONIC GASTROESOPHAGEAL REFLUX DISEASE WITHOUT ESOPHAGITIS: ICD-10-CM

## 2025-07-03 DIAGNOSIS — E66.01 MORBID OBESITY WITH BMI OF 40.0-44.9, ADULT (HCC): ICD-10-CM

## 2025-07-03 DIAGNOSIS — E88.810 METABOLIC SYNDROME: Primary | ICD-10-CM

## 2025-07-03 PROCEDURE — 3077F SYST BP >= 140 MM HG: CPT | Performed by: SURGERY

## 2025-07-03 PROCEDURE — 3080F DIAST BP >= 90 MM HG: CPT | Performed by: SURGERY

## 2025-07-03 PROCEDURE — 99214 OFFICE O/P EST MOD 30 MIN: CPT | Performed by: SURGERY

## 2025-07-03 NOTE — PROGRESS NOTES
[facility]   General & Laparoscopic Surgery  [employer]       HPI:     Eva Rowell is a very pleasant 44 y.o. female with Body mass index is 43.43 kg/m².  , Pre-Surgery.   Pre-operative clearance and work up pending. Working hard to keep good dietary habits as well level of activity.  Patient denies any nausea, vomiting, fevers, chills, shortness of breath, chest pain, cough, constipation or difficulty urinating.      Past Medical History:   Diagnosis Date    Asthma     Chronic gastroesophageal reflux disease without esophagitis     Hypertension, essential     Hyperthyroidism     Metabolic syndrome     Migraines     Morbid obesity with BMI of 40.0-44.9, adult (HCC)     PCOS (polycystic ovarian syndrome)     Sleep apnea, obstructive      Past Surgical History:   Procedure Laterality Date    HYSTERECTOMY (CERVIX STATUS UNKNOWN)      PARATHYROIDECTOMY Right 05/11/2009    right inferior    UPPER GASTROINTESTINAL ENDOSCOPY N/A 4/14/2025    ESOPHAGOGASTRODUODENOSCOPY BIOPSY performed by Ajit Sawant DO at [facility]     Family History   Problem Relation Age of Onset    Hypertension Mother     Coronary Art Dis Mother     Coronary Art Dis Father     Hypertension Father     Clotting Disorder Father     COPD Maternal Grandmother     Cancer Maternal Grandmother     Stroke Maternal Grandmother     Hypertension Maternal Grandmother     Glaucoma Maternal Grandmother     Asthma Brother     Migraines Brother     Seizures Brother     Alcohol Abuse Maternal Uncle      Social History     Tobacco Use    Smoking status: Never     Passive exposure: Never    Smokeless tobacco: Never   Substance Use Topics    Alcohol use: No     I counseled the patient on the importance of not smoking and risks of ETOH.   No Known Allergies  Vitals:    07/03/25 0901   BP: (!) 178/100   Pulse: 72   Weight: 118.4 kg (261 lb)   Height: 1.651 m (5' 5\")       Body mass index is 43.43 kg/m².      Current Outpatient

## 2025-08-07 ENCOUNTER — OFFICE VISIT (OUTPATIENT)
Dept: BARIATRICS/WEIGHT MGMT | Age: 45
End: 2025-08-07
Payer: COMMERCIAL

## 2025-08-07 VITALS
SYSTOLIC BLOOD PRESSURE: 180 MMHG | HEIGHT: 65 IN | HEART RATE: 81 BPM | WEIGHT: 260 LBS | BODY MASS INDEX: 43.32 KG/M2 | DIASTOLIC BLOOD PRESSURE: 119 MMHG

## 2025-08-07 DIAGNOSIS — I10 HYPERTENSION, ESSENTIAL: ICD-10-CM

## 2025-08-07 DIAGNOSIS — K21.9 CHRONIC GASTROESOPHAGEAL REFLUX DISEASE WITHOUT ESOPHAGITIS: ICD-10-CM

## 2025-08-07 DIAGNOSIS — E66.01 MORBID OBESITY WITH BMI OF 40.0-44.9, ADULT (HCC): ICD-10-CM

## 2025-08-07 DIAGNOSIS — E88.810 METABOLIC SYNDROME: Primary | ICD-10-CM

## 2025-08-07 DIAGNOSIS — G47.33 OSA ON CPAP: ICD-10-CM

## 2025-08-07 PROCEDURE — 3077F SYST BP >= 140 MM HG: CPT | Performed by: SURGERY

## 2025-08-07 PROCEDURE — 3080F DIAST BP >= 90 MM HG: CPT | Performed by: SURGERY

## 2025-08-07 PROCEDURE — 99214 OFFICE O/P EST MOD 30 MIN: CPT | Performed by: SURGERY

## 2025-08-13 ENCOUNTER — OFFICE VISIT (OUTPATIENT)
Dept: SURGERY | Age: 45
End: 2025-08-13
Payer: COMMERCIAL

## 2025-08-13 VITALS
OXYGEN SATURATION: 96 % | BODY MASS INDEX: 43.47 KG/M2 | RESPIRATION RATE: 16 BRPM | WEIGHT: 261.2 LBS | HEART RATE: 66 BPM | SYSTOLIC BLOOD PRESSURE: 196 MMHG | DIASTOLIC BLOOD PRESSURE: 133 MMHG

## 2025-08-13 DIAGNOSIS — Z80.0 FAMILY HISTORY OF COLON CANCER: ICD-10-CM

## 2025-08-13 DIAGNOSIS — R15.0 INCOMPLETE DEFECATION: Primary | ICD-10-CM

## 2025-08-13 PROCEDURE — 3080F DIAST BP >= 90 MM HG: CPT | Performed by: SURGERY

## 2025-08-13 PROCEDURE — 99204 OFFICE O/P NEW MOD 45 MIN: CPT | Performed by: SURGERY

## 2025-08-13 PROCEDURE — 3077F SYST BP >= 140 MM HG: CPT | Performed by: SURGERY

## 2025-08-13 RX ORDER — MULTIVITAMIN WITH IRON
1 TABLET ORAL DAILY
COMMUNITY

## 2025-08-19 ENCOUNTER — INITIAL CONSULT (OUTPATIENT)
Dept: BARIATRICS/WEIGHT MGMT | Age: 45
End: 2025-08-19
Payer: COMMERCIAL

## 2025-08-19 DIAGNOSIS — F41.8 DEPRESSION WITH ANXIETY: Primary | ICD-10-CM

## 2025-08-19 DIAGNOSIS — E66.01 MORBID OBESITY WITH BMI OF 40.0-44.9, ADULT (HCC): ICD-10-CM

## 2025-08-19 PROCEDURE — 90791 PSYCH DIAGNOSTIC EVALUATION: CPT | Performed by: PSYCHOLOGIST

## 2025-08-19 PROCEDURE — 96136 PSYCL/NRPSYC TST PHY/QHP 1ST: CPT | Performed by: PSYCHOLOGIST

## 2025-08-19 PROCEDURE — 96130 PSYCL TST EVAL PHYS/QHP 1ST: CPT | Performed by: PSYCHOLOGIST

## (undated) DEVICE — FORCEPS BX L240CM JAW DIA2.8MM L CAP W/ NDL MIC MESH TOOTH